# Patient Record
Sex: MALE | Race: OTHER | NOT HISPANIC OR LATINO
[De-identification: names, ages, dates, MRNs, and addresses within clinical notes are randomized per-mention and may not be internally consistent; named-entity substitution may affect disease eponyms.]

---

## 2017-03-11 ENCOUNTER — APPOINTMENT (OUTPATIENT)
Dept: INTERNAL MEDICINE | Facility: CLINIC | Age: 52
End: 2017-03-11

## 2017-03-11 ENCOUNTER — MED ADMIN CHARGE (OUTPATIENT)
Age: 52
End: 2017-03-11

## 2017-03-11 ENCOUNTER — NON-APPOINTMENT (OUTPATIENT)
Age: 52
End: 2017-03-11

## 2017-03-11 VITALS
HEIGHT: 73.2 IN | DIASTOLIC BLOOD PRESSURE: 66 MMHG | HEART RATE: 76 BPM | WEIGHT: 149 LBS | OXYGEN SATURATION: 99 % | SYSTOLIC BLOOD PRESSURE: 100 MMHG | TEMPERATURE: 97.9 F | BODY MASS INDEX: 19.54 KG/M2

## 2017-03-11 DIAGNOSIS — H33.21 SEROUS RETINAL DETACHMENT, RIGHT EYE: ICD-10-CM

## 2017-03-11 LAB
BILIRUB UR QL STRIP: NORMAL
GLUCOSE UR-MCNC: NORMAL
HCG UR QL: 0.2 EU/DL
HGB UR QL STRIP.AUTO: NORMAL
KETONES UR-MCNC: NORMAL
LEUKOCYTE ESTERASE UR QL STRIP: NORMAL
NITRITE UR QL STRIP: NORMAL
PH UR STRIP: 7
PROT UR STRIP-MCNC: NORMAL
SP GR UR STRIP: 1.02

## 2017-03-17 ENCOUNTER — TRANSCRIPTION ENCOUNTER (OUTPATIENT)
Age: 52
End: 2017-03-17

## 2017-04-21 ENCOUNTER — MESSAGE (OUTPATIENT)
Age: 52
End: 2017-04-21

## 2017-04-21 ENCOUNTER — TRANSCRIPTION ENCOUNTER (OUTPATIENT)
Age: 52
End: 2017-04-21

## 2017-04-21 LAB
25(OH)D3 SERPL-MCNC: 15.5 NG/ML
ALBUMIN SERPL ELPH-MCNC: 4.4 G/DL
ALP BLD-CCNC: 82 U/L
ALT SERPL-CCNC: 19 U/L
ANION GAP SERPL CALC-SCNC: 16 MMOL/L
APPEARANCE: CLEAR
AST SERPL-CCNC: 25 U/L
BACTERIA: NEGATIVE
BASOPHILS # BLD AUTO: 0.04 K/UL
BASOPHILS NFR BLD AUTO: 0.9 %
BILIRUB SERPL-MCNC: 0.8 MG/DL
BILIRUBIN URINE: NEGATIVE
BLOOD URINE: NEGATIVE
BUN SERPL-MCNC: 15 MG/DL
CALCIUM SERPL-MCNC: 10 MG/DL
CHLORIDE SERPL-SCNC: 99 MMOL/L
CHOLEST SERPL-MCNC: 208 MG/DL
CHOLEST/HDLC SERPL: 2.4 RATIO
CO2 SERPL-SCNC: 26 MMOL/L
COLOR: YELLOW
CREAT SERPL-MCNC: 0.96 MG/DL
EOSINOPHIL # BLD AUTO: 0.17 K/UL
EOSINOPHIL NFR BLD AUTO: 3.7 %
GLUCOSE QUALITATIVE U: NORMAL MG/DL
GLUCOSE SERPL-MCNC: 83 MG/DL
HBA1C MFR BLD HPLC: 5.7 %
HCT VFR BLD CALC: 46.2 %
HDLC SERPL-MCNC: 85 MG/DL
HGB BLD-MCNC: 14.8 G/DL
HYALINE CASTS: 1 /LPF
IMM GRANULOCYTES NFR BLD AUTO: 0 %
KETONES URINE: NEGATIVE
LDLC SERPL CALC-MCNC: 109 MG/DL
LEUKOCYTE ESTERASE URINE: NEGATIVE
LYMPHOCYTES # BLD AUTO: 1.27 K/UL
LYMPHOCYTES NFR BLD AUTO: 27.3 %
MAN DIFF?: NORMAL
MCHC RBC-ENTMCNC: 30.3 PG
MCHC RBC-ENTMCNC: 32 GM/DL
MCV RBC AUTO: 94.7 FL
MICROSCOPIC-UA: NORMAL
MONOCYTES # BLD AUTO: 0.39 K/UL
MONOCYTES NFR BLD AUTO: 8.4 %
NEUTROPHILS # BLD AUTO: 2.78 K/UL
NEUTROPHILS NFR BLD AUTO: 59.7 %
NITRITE URINE: NEGATIVE
PH URINE: 6.5
PLATELET # BLD AUTO: 205 K/UL
POTASSIUM SERPL-SCNC: 4.1 MMOL/L
PROT SERPL-MCNC: 8.1 G/DL
PROTEIN URINE: NEGATIVE MG/DL
PSA SERPL-MCNC: 0.78 NG/ML
RBC # BLD: 4.88 M/UL
RBC # FLD: 15.5 %
RED BLOOD CELLS URINE: 5 /HPF
SODIUM SERPL-SCNC: 141 MMOL/L
SPECIFIC GRAVITY URINE: 1.02
SQUAMOUS EPITHELIAL CELLS: 0 /HPF
T4 FREE SERPL-MCNC: 1.1 NG/DL
TRIGL SERPL-MCNC: 72 MG/DL
TSH SERPL-ACNC: 3.87 UIU/ML
UROBILINOGEN URINE: NORMAL MG/DL
WBC # FLD AUTO: 4.65 K/UL
WHITE BLOOD CELLS URINE: 2 /HPF

## 2017-09-07 ENCOUNTER — APPOINTMENT (OUTPATIENT)
Dept: GASTROENTEROLOGY | Facility: CLINIC | Age: 52
End: 2017-09-07

## 2018-02-03 ENCOUNTER — APPOINTMENT (OUTPATIENT)
Dept: INTERNAL MEDICINE | Facility: CLINIC | Age: 53
End: 2018-02-03
Payer: COMMERCIAL

## 2018-02-03 ENCOUNTER — NON-APPOINTMENT (OUTPATIENT)
Age: 53
End: 2018-02-03

## 2018-02-03 ENCOUNTER — LABORATORY RESULT (OUTPATIENT)
Age: 53
End: 2018-02-03

## 2018-02-03 VITALS
HEIGHT: 73 IN | TEMPERATURE: 97.7 F | OXYGEN SATURATION: 99 % | BODY MASS INDEX: 20.54 KG/M2 | HEART RATE: 76 BPM | SYSTOLIC BLOOD PRESSURE: 100 MMHG | WEIGHT: 155 LBS | DIASTOLIC BLOOD PRESSURE: 80 MMHG

## 2018-02-03 PROCEDURE — 36415 COLL VENOUS BLD VENIPUNCTURE: CPT

## 2018-02-03 PROCEDURE — 82270 OCCULT BLOOD FECES: CPT

## 2018-02-03 PROCEDURE — 99396 PREV VISIT EST AGE 40-64: CPT | Mod: 25

## 2018-02-03 PROCEDURE — 93000 ELECTROCARDIOGRAM COMPLETE: CPT

## 2018-02-03 RX ORDER — APREMILAST 10-20-30MG
10 & 20 & 30 KIT ORAL
Qty: 55 | Refills: 0 | Status: COMPLETED | COMMUNITY
Start: 2018-01-26

## 2018-02-05 ENCOUNTER — TRANSCRIPTION ENCOUNTER (OUTPATIENT)
Age: 53
End: 2018-02-05

## 2018-02-05 LAB
25(OH)D3 SERPL-MCNC: 12.1 NG/ML
APPEARANCE: CLEAR
BACTERIA: NEGATIVE
BASOPHILS # BLD AUTO: 0.03 K/UL
BASOPHILS NFR BLD AUTO: 0.5 %
BILIRUBIN URINE: NEGATIVE
BLOOD URINE: NEGATIVE
CHOLEST SERPL-MCNC: 221 MG/DL
CHOLEST/HDLC SERPL: 2.7 RATIO
COLOR: YELLOW
EOSINOPHIL # BLD AUTO: 0.22 K/UL
EOSINOPHIL NFR BLD AUTO: 3.9 %
ESTIMATED AVERAGE GLUCOSE: 111 MG/DL
GLUCOSE QUALITATIVE U: NEGATIVE MG/DL
HBA1C MFR BLD HPLC: 5.5 %
HCT VFR BLD CALC: 45.2 %
HDLC SERPL-MCNC: 82 MG/DL
HGB BLD-MCNC: 14.4 G/DL
HYALINE CASTS: 3 /LPF
IMM GRANULOCYTES NFR BLD AUTO: 0.2 %
KETONES URINE: NEGATIVE
LDLC SERPL CALC-MCNC: 121 MG/DL
LEUKOCYTE ESTERASE URINE: NEGATIVE
LYMPHOCYTES # BLD AUTO: 1.64 K/UL
LYMPHOCYTES NFR BLD AUTO: 29.3 %
MAN DIFF?: NORMAL
MCHC RBC-ENTMCNC: 29.8 PG
MCHC RBC-ENTMCNC: 31.9 GM/DL
MCV RBC AUTO: 93.6 FL
MICROSCOPIC-UA: NORMAL
MONOCYTES # BLD AUTO: 0.54 K/UL
MONOCYTES NFR BLD AUTO: 9.6 %
NEUTROPHILS # BLD AUTO: 3.16 K/UL
NEUTROPHILS NFR BLD AUTO: 56.5 %
NITRITE URINE: NEGATIVE
PH URINE: 5
PLATELET # BLD AUTO: 203 K/UL
PROTEIN URINE: NEGATIVE MG/DL
PSA SERPL-MCNC: 0.92 NG/ML
RBC # BLD: 4.83 M/UL
RBC # FLD: 14.7 %
RED BLOOD CELLS URINE: 5 /HPF
SPECIFIC GRAVITY URINE: 1.02
SQUAMOUS EPITHELIAL CELLS: 1 /HPF
T4 FREE SERPL-MCNC: 1.1 NG/DL
TRIGL SERPL-MCNC: 88 MG/DL
TSH SERPL-ACNC: 3.62 UIU/ML
UROBILINOGEN URINE: NEGATIVE MG/DL
WBC # FLD AUTO: 5.6 K/UL
WHITE BLOOD CELLS URINE: 1 /HPF

## 2018-03-06 ENCOUNTER — APPOINTMENT (OUTPATIENT)
Dept: UROLOGY | Facility: CLINIC | Age: 53
End: 2018-03-06

## 2018-03-23 ENCOUNTER — APPOINTMENT (OUTPATIENT)
Dept: UROLOGY | Facility: CLINIC | Age: 53
End: 2018-03-23
Payer: COMMERCIAL

## 2018-03-23 VITALS
BODY MASS INDEX: 18.65 KG/M2 | TEMPERATURE: 98.1 F | DIASTOLIC BLOOD PRESSURE: 68 MMHG | HEART RATE: 76 BPM | WEIGHT: 150 LBS | HEIGHT: 75 IN | SYSTOLIC BLOOD PRESSURE: 119 MMHG

## 2018-03-23 PROCEDURE — 99204 OFFICE O/P NEW MOD 45 MIN: CPT

## 2018-03-26 LAB
APPEARANCE: CLEAR
BACTERIA: NEGATIVE
BILIRUBIN URINE: NEGATIVE
BLOOD URINE: ABNORMAL
COLOR: YELLOW
GLUCOSE QUALITATIVE U: NEGATIVE MG/DL
HYALINE CASTS: 2 /LPF
KETONES URINE: NEGATIVE
LEUKOCYTE ESTERASE URINE: NEGATIVE
MICROSCOPIC-UA: NORMAL
NITRITE URINE: NEGATIVE
PH URINE: 5.5
PROTEIN URINE: NEGATIVE MG/DL
RED BLOOD CELLS URINE: 5 /HPF
SPECIFIC GRAVITY URINE: 1.02
SQUAMOUS EPITHELIAL CELLS: 0 /HPF
URINE CULTURE <10: NORMAL
UROBILINOGEN URINE: NEGATIVE MG/DL
WHITE BLOOD CELLS URINE: 1 /HPF

## 2018-03-27 LAB — CORE LAB FLUID CYTOLOGY: NORMAL

## 2018-04-05 ENCOUNTER — FORM ENCOUNTER (OUTPATIENT)
Age: 53
End: 2018-04-05

## 2018-04-06 ENCOUNTER — OUTPATIENT (OUTPATIENT)
Dept: OUTPATIENT SERVICES | Facility: HOSPITAL | Age: 53
LOS: 1 days | End: 2018-04-06
Payer: COMMERCIAL

## 2018-04-06 ENCOUNTER — APPOINTMENT (OUTPATIENT)
Dept: CT IMAGING | Facility: IMAGING CENTER | Age: 53
End: 2018-04-06
Payer: COMMERCIAL

## 2018-04-06 DIAGNOSIS — R31.29 OTHER MICROSCOPIC HEMATURIA: ICD-10-CM

## 2018-04-06 PROCEDURE — 74178 CT ABD&PLV WO CNTR FLWD CNTR: CPT

## 2018-04-06 PROCEDURE — 74178 CT ABD&PLV WO CNTR FLWD CNTR: CPT | Mod: 26

## 2018-04-13 ENCOUNTER — MESSAGE (OUTPATIENT)
Age: 53
End: 2018-04-13

## 2018-04-17 ENCOUNTER — OUTPATIENT (OUTPATIENT)
Dept: OUTPATIENT SERVICES | Facility: HOSPITAL | Age: 53
LOS: 1 days | End: 2018-04-17
Payer: COMMERCIAL

## 2018-04-17 ENCOUNTER — APPOINTMENT (OUTPATIENT)
Dept: UROLOGY | Facility: CLINIC | Age: 53
End: 2018-04-17
Payer: COMMERCIAL

## 2018-04-17 DIAGNOSIS — R35.0 FREQUENCY OF MICTURITION: ICD-10-CM

## 2018-04-17 PROCEDURE — 52000 CYSTOURETHROSCOPY: CPT

## 2018-04-18 ENCOUNTER — MESSAGE (OUTPATIENT)
Age: 53
End: 2018-04-18

## 2018-04-19 ENCOUNTER — APPOINTMENT (OUTPATIENT)
Dept: SURGERY | Facility: CLINIC | Age: 53
End: 2018-04-19
Payer: COMMERCIAL

## 2018-04-19 VITALS
TEMPERATURE: 98.1 F | HEART RATE: 93 BPM | SYSTOLIC BLOOD PRESSURE: 110 MMHG | OXYGEN SATURATION: 99 % | DIASTOLIC BLOOD PRESSURE: 70 MMHG | HEIGHT: 75 IN | WEIGHT: 155 LBS | BODY MASS INDEX: 19.27 KG/M2 | RESPIRATION RATE: 16 BRPM

## 2018-04-19 DIAGNOSIS — Z87.19 PERSONAL HISTORY OF OTHER DISEASES OF THE DIGESTIVE SYSTEM: ICD-10-CM

## 2018-04-19 PROCEDURE — 99244 OFF/OP CNSLTJ NEW/EST MOD 40: CPT

## 2018-04-19 RX ORDER — MELOXICAM 15 MG/1
15 TABLET ORAL
Qty: 30 | Refills: 0 | Status: DISCONTINUED | COMMUNITY
Start: 2018-01-12 | End: 2018-04-19

## 2018-04-19 RX ORDER — HYDROCORTISONE 25 MG/G
2.5 CREAM TOPICAL
Qty: 30 | Refills: 0 | Status: DISCONTINUED | COMMUNITY
Start: 2018-01-19 | End: 2018-04-19

## 2018-04-19 RX ORDER — VIT A/VIT C/VIT E/ZINC/COPPER 4296-226
CAPSULE ORAL TWICE DAILY
Refills: 0 | Status: DISCONTINUED | COMMUNITY
End: 2018-04-19

## 2018-04-19 RX ORDER — CLOBETASOL PROPIONATE 0.5 MG/ML
0.05 SOLUTION TOPICAL
Qty: 50 | Refills: 0 | Status: DISCONTINUED | COMMUNITY
Start: 2018-01-19 | End: 2018-04-19

## 2018-04-20 DIAGNOSIS — R31.21 ASYMPTOMATIC MICROSCOPIC HEMATURIA: ICD-10-CM

## 2018-05-11 ENCOUNTER — OUTPATIENT (OUTPATIENT)
Dept: OUTPATIENT SERVICES | Facility: HOSPITAL | Age: 53
LOS: 1 days | End: 2018-05-11
Payer: COMMERCIAL

## 2018-05-11 VITALS
WEIGHT: 149.03 LBS | OXYGEN SATURATION: 98 % | RESPIRATION RATE: 16 BRPM | HEART RATE: 65 BPM | HEIGHT: 75 IN | SYSTOLIC BLOOD PRESSURE: 116 MMHG | TEMPERATURE: 98 F | DIASTOLIC BLOOD PRESSURE: 77 MMHG

## 2018-05-11 DIAGNOSIS — K38.8 OTHER SPECIFIED DISEASES OF APPENDIX: ICD-10-CM

## 2018-05-11 DIAGNOSIS — H33.21 SEROUS RETINAL DETACHMENT, RIGHT EYE: Chronic | ICD-10-CM

## 2018-05-11 DIAGNOSIS — Z01.818 ENCOUNTER FOR OTHER PREPROCEDURAL EXAMINATION: ICD-10-CM

## 2018-05-11 DIAGNOSIS — Z98.890 OTHER SPECIFIED POSTPROCEDURAL STATES: Chronic | ICD-10-CM

## 2018-05-11 DIAGNOSIS — J34.2 DEVIATED NASAL SEPTUM: Chronic | ICD-10-CM

## 2018-05-11 DIAGNOSIS — L40.50 ARTHROPATHIC PSORIASIS, UNSPECIFIED: ICD-10-CM

## 2018-05-11 DIAGNOSIS — K38.9 DISEASE OF APPENDIX, UNSPECIFIED: ICD-10-CM

## 2018-05-11 LAB
HCT VFR BLD CALC: 41.6 % — SIGNIFICANT CHANGE UP (ref 39–50)
HGB BLD-MCNC: 13.2 G/DL — SIGNIFICANT CHANGE UP (ref 13–17)
MCHC RBC-ENTMCNC: 29.3 PG — SIGNIFICANT CHANGE UP (ref 27–34)
MCHC RBC-ENTMCNC: 31.7 GM/DL — LOW (ref 32–36)
MCV RBC AUTO: 92.2 FL — SIGNIFICANT CHANGE UP (ref 80–100)
PLATELET # BLD AUTO: 230 K/UL — SIGNIFICANT CHANGE UP (ref 150–400)
RBC # BLD: 4.51 M/UL — SIGNIFICANT CHANGE UP (ref 4.2–5.8)
RBC # FLD: 15.1 % — HIGH (ref 10.3–14.5)
WBC # BLD: 5.3 K/UL — SIGNIFICANT CHANGE UP (ref 3.8–10.5)
WBC # FLD AUTO: 5.3 K/UL — SIGNIFICANT CHANGE UP (ref 3.8–10.5)

## 2018-05-11 PROCEDURE — 85027 COMPLETE CBC AUTOMATED: CPT

## 2018-05-11 PROCEDURE — G0463: CPT

## 2018-05-11 RX ORDER — CEFOTETAN DISODIUM 1 G
2 VIAL (EA) INJECTION ONCE
Qty: 0 | Refills: 0 | Status: DISCONTINUED | OUTPATIENT
Start: 2018-05-21 | End: 2018-06-05

## 2018-05-11 NOTE — H&P PST ADULT - PMH
Appendix disease    Deviated septum    Inguinal hernia    Psoriatic arthritis  controlled on RX  Right retinal detachment

## 2018-05-11 NOTE — H&P PST ADULT - HISTORY OF PRESENT ILLNESS
54 Y/O Male reports he was seen by Dr Herrera for Microscopic hematuria, S/P CT ABD revealed Mucocele appendix. Seen by Dr Grace. Pt denies any fever , chills, ABD pain, N/V, change in bowel habits.   Presents for Laparoscopic appendectomy 5/21/18.

## 2018-05-11 NOTE — H&P PST ADULT - PSH
Deviated septum  s/p repair  H/O right inguinal hernia repair    Right retinal detachment  s/p repair

## 2018-05-11 NOTE — H&P PST ADULT - NSANTHOSAYNRD_GEN_A_CORE
No. CHU screening performed.  STOP BANG Legend: 0-2 = LOW Risk; 3-4 = INTERMEDIATE Risk; 5-8 = HIGH Risk

## 2018-05-11 NOTE — H&P PST ADULT - CONSTITUTIONAL DETAILS
no distress/well-groomed/well-developed/well-nourished no distress/well-developed/well-groomed/cachectic

## 2018-05-20 ENCOUNTER — TRANSCRIPTION ENCOUNTER (OUTPATIENT)
Age: 53
End: 2018-05-20

## 2018-05-21 ENCOUNTER — OUTPATIENT (OUTPATIENT)
Dept: OUTPATIENT SERVICES | Facility: HOSPITAL | Age: 53
LOS: 1 days | End: 2018-05-21
Payer: COMMERCIAL

## 2018-05-21 ENCOUNTER — APPOINTMENT (OUTPATIENT)
Dept: SURGERY | Facility: HOSPITAL | Age: 53
End: 2018-05-21
Payer: COMMERCIAL

## 2018-05-21 ENCOUNTER — RESULT REVIEW (OUTPATIENT)
Age: 53
End: 2018-05-21

## 2018-05-21 VITALS
SYSTOLIC BLOOD PRESSURE: 107 MMHG | HEART RATE: 78 BPM | TEMPERATURE: 98 F | DIASTOLIC BLOOD PRESSURE: 71 MMHG | RESPIRATION RATE: 18 BRPM | HEIGHT: 75 IN | WEIGHT: 149.03 LBS | OXYGEN SATURATION: 100 %

## 2018-05-21 VITALS
OXYGEN SATURATION: 100 % | SYSTOLIC BLOOD PRESSURE: 114 MMHG | HEART RATE: 52 BPM | TEMPERATURE: 98 F | RESPIRATION RATE: 16 BRPM | DIASTOLIC BLOOD PRESSURE: 70 MMHG

## 2018-05-21 DIAGNOSIS — K38.8 OTHER SPECIFIED DISEASES OF APPENDIX: ICD-10-CM

## 2018-05-21 DIAGNOSIS — Z98.890 OTHER SPECIFIED POSTPROCEDURAL STATES: Chronic | ICD-10-CM

## 2018-05-21 DIAGNOSIS — H33.21 SEROUS RETINAL DETACHMENT, RIGHT EYE: Chronic | ICD-10-CM

## 2018-05-21 DIAGNOSIS — J34.2 DEVIATED NASAL SEPTUM: Chronic | ICD-10-CM

## 2018-05-21 PROCEDURE — 44970 LAPAROSCOPY APPENDECTOMY: CPT

## 2018-05-21 PROCEDURE — 88304 TISSUE EXAM BY PATHOLOGIST: CPT

## 2018-05-21 PROCEDURE — 88304 TISSUE EXAM BY PATHOLOGIST: CPT | Mod: 26

## 2018-05-21 RX ORDER — SODIUM CHLORIDE 9 MG/ML
3 INJECTION INTRAMUSCULAR; INTRAVENOUS; SUBCUTANEOUS EVERY 8 HOURS
Qty: 0 | Refills: 0 | Status: DISCONTINUED | OUTPATIENT
Start: 2018-05-21 | End: 2018-05-21

## 2018-05-21 RX ORDER — ONDANSETRON 8 MG/1
4 TABLET, FILM COATED ORAL ONCE
Qty: 0 | Refills: 0 | Status: DISCONTINUED | OUTPATIENT
Start: 2018-05-21 | End: 2018-05-21

## 2018-05-21 RX ORDER — SODIUM CHLORIDE 9 MG/ML
1000 INJECTION, SOLUTION INTRAVENOUS
Qty: 0 | Refills: 0 | Status: DISCONTINUED | OUTPATIENT
Start: 2018-05-21 | End: 2018-06-05

## 2018-05-21 RX ORDER — HYDROMORPHONE HYDROCHLORIDE 2 MG/ML
0.5 INJECTION INTRAMUSCULAR; INTRAVENOUS; SUBCUTANEOUS
Qty: 0 | Refills: 0 | Status: DISCONTINUED | OUTPATIENT
Start: 2018-05-21 | End: 2018-05-21

## 2018-05-21 RX ORDER — LIDOCAINE HCL 20 MG/ML
0.2 VIAL (ML) INJECTION ONCE
Qty: 0 | Refills: 0 | Status: DISCONTINUED | OUTPATIENT
Start: 2018-05-21 | End: 2018-05-21

## 2018-05-21 NOTE — ASU DISCHARGE PLAN (ADULT/PEDIATRIC). - MEDICATION SUMMARY - MEDICATIONS TO TAKE
I will START or STAY ON the medications listed below when I get home from the hospital:    Percocet 5/325 oral tablet  -- 1 tab(s) by mouth every 6 hours MDD:4  -- Caution federal law prohibits the transfer of this drug to any person other  than the person for whom it was prescribed.  May cause drowsiness.  Alcohol may intensify this effect.  Use care when operating dangerous machinery.  This prescription cannot be refilled.  This product contains acetaminophen.  Do not use  with any other product containing acetaminophen to prevent possible liver damage.  Using more of this medication than prescribed may cause serious breathing problems.    -- Indication: For Post operative pain    Otezla 30 mg oral tablet  -- 1 tab(s) by mouth 2 times a day  -- Indication: For home medication

## 2018-05-21 NOTE — ASU DISCHARGE PLAN (ADULT/PEDIATRIC). - NOTIFY
Unable to Urinate/Pain not relieved by Medications/Fever greater than 101/Persistent Nausea and Vomiting

## 2018-05-21 NOTE — BRIEF OPERATIVE NOTE - PROCEDURE
<<-----Click on this checkbox to enter Procedure Laparoscopic appendectomy  05/21/2018    Active  [id number]

## 2018-05-29 LAB — SURGICAL PATHOLOGY STUDY: SIGNIFICANT CHANGE UP

## 2018-06-05 ENCOUNTER — APPOINTMENT (OUTPATIENT)
Dept: SURGERY | Facility: CLINIC | Age: 53
End: 2018-06-05
Payer: COMMERCIAL

## 2018-06-05 VITALS
OXYGEN SATURATION: 99 % | RESPIRATION RATE: 14 BRPM | WEIGHT: 155 LBS | DIASTOLIC BLOOD PRESSURE: 73 MMHG | HEART RATE: 74 BPM | HEIGHT: 75 IN | SYSTOLIC BLOOD PRESSURE: 112 MMHG | TEMPERATURE: 98.3 F | BODY MASS INDEX: 19.27 KG/M2

## 2018-06-05 DIAGNOSIS — K38.8 OTHER SPECIFIED DISEASES OF APPENDIX: ICD-10-CM

## 2018-06-05 PROCEDURE — 99024 POSTOP FOLLOW-UP VISIT: CPT

## 2018-12-21 PROBLEM — K40.90 UNILATERAL INGUINAL HERNIA, WITHOUT OBSTRUCTION OR GANGRENE, NOT SPECIFIED AS RECURRENT: Chronic | Status: ACTIVE | Noted: 2018-05-11

## 2018-12-21 PROBLEM — K38.9 DISEASE OF APPENDIX, UNSPECIFIED: Chronic | Status: ACTIVE | Noted: 2018-05-11

## 2018-12-21 PROBLEM — J34.2 DEVIATED NASAL SEPTUM: Chronic | Status: ACTIVE | Noted: 2018-05-11

## 2018-12-21 PROBLEM — H33.21 SEROUS RETINAL DETACHMENT, RIGHT EYE: Chronic | Status: ACTIVE | Noted: 2018-05-11

## 2018-12-28 ENCOUNTER — APPOINTMENT (OUTPATIENT)
Dept: INTERNAL MEDICINE | Facility: CLINIC | Age: 53
End: 2018-12-28
Payer: COMMERCIAL

## 2018-12-28 VITALS
OXYGEN SATURATION: 99 % | WEIGHT: 146 LBS | HEART RATE: 77 BPM | HEIGHT: 73 IN | BODY MASS INDEX: 19.35 KG/M2 | SYSTOLIC BLOOD PRESSURE: 90 MMHG | TEMPERATURE: 98.6 F | DIASTOLIC BLOOD PRESSURE: 60 MMHG

## 2018-12-28 VITALS — SYSTOLIC BLOOD PRESSURE: 104 MMHG | DIASTOLIC BLOOD PRESSURE: 65 MMHG

## 2018-12-28 PROCEDURE — 99214 OFFICE O/P EST MOD 30 MIN: CPT

## 2018-12-29 ENCOUNTER — TRANSCRIPTION ENCOUNTER (OUTPATIENT)
Age: 53
End: 2018-12-29

## 2018-12-31 ENCOUNTER — MESSAGE (OUTPATIENT)
Age: 53
End: 2018-12-31

## 2018-12-31 LAB — RAPID RVP RESULT: NOT DETECTED

## 2019-01-04 ENCOUNTER — MESSAGE (OUTPATIENT)
Age: 54
End: 2019-01-04

## 2019-01-08 ENCOUNTER — APPOINTMENT (OUTPATIENT)
Dept: NEUROSURGERY | Facility: CLINIC | Age: 54
End: 2019-01-08
Payer: COMMERCIAL

## 2019-01-08 VITALS
WEIGHT: 146 LBS | SYSTOLIC BLOOD PRESSURE: 130 MMHG | HEART RATE: 67 BPM | BODY MASS INDEX: 19.35 KG/M2 | DIASTOLIC BLOOD PRESSURE: 79 MMHG | HEIGHT: 73 IN

## 2019-01-08 PROCEDURE — 99203 OFFICE O/P NEW LOW 30 MIN: CPT

## 2019-01-22 NOTE — REASON FOR VISIT
[New Patient Visit] : a new patient visit [Referred By: _________] : Patient was referred by [facility] [FreeTextEntry1] : brother diagnosed with a brain tumor

## 2019-01-22 NOTE — HISTORY OF PRESENT ILLNESS
[< 3 months] : less than 3 months [FreeTextEntry1] : concerned since brother diagnosed with a brain tumor [de-identified] : Mr. Montiel is a 55 yo male who arrives with no reports of PMH who is concerned about his familial history of a brain tumor.  His brother was recently diagnosed with a brain tumor and had surgery in [place] with no prior symptoms.  He does not have any other information, only that there were 7 tumors.  He is asymptomatic with no complaints.

## 2019-01-22 NOTE — ASSESSMENT
[FreeTextEntry1] : Mr. Montiel is a 55 yo male who is concerned about his genetic disposition for having a malignant brain tumor since his brother was diagnosed without any prior symptoms.  He is asymptomatic.  We explained we cannot do an MRI as a screening tool with no symptomatology.  We will refer to his PCP for any possible screening that is indicated.  No neurosurgical f/u required at this time.\par \par 1)  No Nsx f/u required.

## 2019-01-22 NOTE — PHYSICAL EXAM
[General Appearance - Alert] : alert [General Appearance - In No Acute Distress] : in no acute distress [General Appearance - Well Nourished] : well nourished [General Appearance - Well Developed] : well developed [Oriented To Time, Place, And Person] : oriented to person, place, and time [Impaired Insight] : insight and judgment were intact [Affect] : the affect was normal [Mood] : the mood was normal [Cranial Nerves Optic (II)] : visual acuity intact bilaterally,  pupils equal round and reactive to light [Cranial Nerves Oculomotor (III)] : extraocular motion intact [Cranial Nerves Trigeminal (V)] : facial sensation intact symmetrically [Cranial Nerves Facial (VII)] : face symmetrical [Cranial Nerves Vestibulocochlear (VIII)] : hearing was intact bilaterally [Cranial Nerves Glossopharyngeal (IX)] : tongue and palate midline [Cranial Nerves Accessory (XI - Cranial And Spinal)] : head turning and shoulder shrug symmetric [Cranial Nerves Hypoglossal (XII)] : there was no tongue deviation with protrusion [Motor Strength] : muscle strength was normal in all four extremities [5] : S1 toe walking 5/5 [Sensation Tactile Decrease] : light touch was intact [Intact] : all reflexes within normal limits bilaterally [PERRL With Normal Accommodation] : pupils were equal in size, round, reactive to light, with normal accommodation [Hearing Threshold Finger Rub Not [place]] : hearing was normal [Neck Appearance] : the appearance of the neck was normal [] : no respiratory distress [Respiration, Rhythm And Depth] : normal respiratory rhythm and effort [Auscultation Breath Sounds / Voice Sounds] : lungs were clear to auscultation bilaterally [Heart Sounds] : normal S1 and S2 [Edema] : there was no peripheral edema [Abnormal Walk] : normal gait [Involuntary Movements] : no involuntary movements were seen [Motor Tone] : muscle strength and tone were normal [Skin Color & Pigmentation] : normal skin color and pigmentation [Romberg's Sign] : Romberg's sign was negtive [Limited Balance] : balance was intact [Tremor] : no tremor present

## 2019-01-22 NOTE — REVIEW OF SYSTEMS
[Negative] : Endocrine [Abdominal Pain] : no abdominal pain [Vomiting] : no vomiting [Diarrhea] : no diarrhea [Dysuria] : no dysuria [Incontinence] : no incontinence [Skin Lesions] : no skin lesions [Skin Wound] : no skin wound [Itching] : no itching [Easy Bleeding] : no tendency for easy bleeding [Easy Bruising] : no tendency for easy bruising

## 2019-01-31 NOTE — HISTORY OF PRESENT ILLNESS
[FreeTextEntry8] : On 12/24/18, patient started to cough, and had a sore throat.  He felt a general weakness.  On 12/26/18, he was coughing up yellow phlegm, with no blood.  No fevers, chills, or chest pain.  There was some dyspnea, worse with walking up steps.  No wheezing.  No sinus pains, body aches, rashes, or Gi symptoms.  No travel, sick contacts, or OTC medications.  Patient saw Dr. Bone and had labs drawn today.\par \par On review of medications, patient notes that he is not on the vitamin D.

## 2019-01-31 NOTE — ASSESSMENT
[FreeTextEntry1] : Patient likely with a viral syndrome.  Will check an RVP, and hold antibiotics for now.  However, patient has a history of a deviated nasal septum, and requests an ENT evaluation.  Referral given.

## 2019-01-31 NOTE — PHYSICAL EXAM
[Normal Voice/Communication] : normal voice/communication [No Respiratory Distress] : no respiratory distress  [Clear to Auscultation] : lungs were clear to auscultation bilaterally [No Accessory Muscle Use] : no accessory muscle use [Normal Rate] : normal rate  [Regular Rhythm] : with a regular rhythm [Normal S1, S2] : normal S1 and S2 [No Murmur] : no murmur heard [No Edema] : there was no peripheral edema [Soft] : abdomen soft [Non Tender] : non-tender [Non-distended] : non-distended [No Masses] : no abdominal mass palpated [Normal Bowel Sounds] : normal bowel sounds [de-identified] : No sinus tenderness.

## 2019-02-05 ENCOUNTER — TRANSCRIPTION ENCOUNTER (OUTPATIENT)
Age: 54
End: 2019-02-05

## 2019-03-14 ENCOUNTER — TRANSCRIPTION ENCOUNTER (OUTPATIENT)
Age: 54
End: 2019-03-14

## 2019-03-18 ENCOUNTER — APPOINTMENT (OUTPATIENT)
Dept: INTERNAL MEDICINE | Facility: CLINIC | Age: 54
End: 2019-03-18
Payer: COMMERCIAL

## 2019-03-18 ENCOUNTER — NON-APPOINTMENT (OUTPATIENT)
Age: 54
End: 2019-03-18

## 2019-03-18 VITALS
OXYGEN SATURATION: 99 % | DIASTOLIC BLOOD PRESSURE: 70 MMHG | BODY MASS INDEX: 18.03 KG/M2 | SYSTOLIC BLOOD PRESSURE: 110 MMHG | HEIGHT: 75 IN | WEIGHT: 145 LBS | HEART RATE: 69 BPM

## 2019-03-18 DIAGNOSIS — J06.9 ACUTE UPPER RESPIRATORY INFECTION, UNSPECIFIED: ICD-10-CM

## 2019-03-18 DIAGNOSIS — Z84.89 FAMILY HISTORY OF OTHER SPECIFIED CONDITIONS: ICD-10-CM

## 2019-03-18 PROCEDURE — 93000 ELECTROCARDIOGRAM COMPLETE: CPT

## 2019-03-18 PROCEDURE — G0444 DEPRESSION SCREEN ANNUAL: CPT

## 2019-03-18 PROCEDURE — 82270 OCCULT BLOOD FECES: CPT

## 2019-03-18 PROCEDURE — 36415 COLL VENOUS BLD VENIPUNCTURE: CPT

## 2019-03-18 PROCEDURE — 99396 PREV VISIT EST AGE 40-64: CPT | Mod: 25

## 2019-03-18 NOTE — HEALTH RISK ASSESSMENT
[Excellent] : ~his/her~  mood as  excellent [No falls in past year] : Patient reported no falls in the past year [0] : 2) Feeling down, depressed, or hopeless: Not at all (0) [Alone] : lives alone [None] : None [Patient reported colonoscopy was normal] : Patient reported colonoscopy was normal [HIV Test offered] : HIV Test offered [] :  [Employed] : employed [Sexually Active] : sexually active [Feels Safe at Home] : Feels safe at home [Fully functional (bathing, dressing, toileting, transferring, walking, feeding)] : Fully functional (bathing, dressing, toileting, transferring, walking, feeding) [Fully functional (using the telephone, shopping, preparing meals, housekeeping, doing laundry, using] : Fully functional and needs no help or supervision to perform IADLs (using the telephone, shopping, preparing meals, housekeeping, doing laundry, using transportation, managing medications and managing finances) [Reports normal functional visual acuity (ie: able to read med bottle)] : Reports normal functional visual acuity [Carbon Monoxide Detector] : carbon monoxide detector [Smoke Detector] : smoke detector [Seat Belt] :  uses seat belt [Sunscreen] : uses sunscreen [With Patient/Caregiver] : With Patient/Caregiver [] : No [[id number]] : 0 [de-identified] : Quit early 30s, started about age 23, was 1/2ppd [Change in mental status noted] : No change in mental status noted [Language] : denies difficulty with language [Behavior] : denies difficulty with behavior [Reasoning] : denies difficulty with reasoning [Handling Complex Tasks] : denies difficulty handling complex tasks [Reports changes in hearing] : Reports no changes in hearing [Reports changes in vision] : Reports no changes in vision [Reports changes in dental health] : Reports no changes in dental health [HepatitisCDate] : 11/08/2014 [ColonoscopyComments] : Will do Cologuard [de-identified] : No h/o STDs. 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 Robb (ophthalmologist). [de-identified] : Going regularly [de-identified] : Drives a car. [FreeTextEntry4] : Patient given Health Care Proxy information today. [AdvancecareDate] : 03/18/2019

## 2019-03-18 NOTE — ASSESSMENT
[FreeTextEntry1] : (1) HCM - discussed diet, exercise, weight maintenance.  Labs ordered as below, including HIV testing. Hepatitis C screening negative November 2014.  Patient declines influenza, Tdap, and Shingrix vaccinations - risks/benefits were discussed.  Patient was given a GI referral for a screening colonoscopy last year and has not gone.  I reviewed the options for FIT and Cologuard with him, and he opts for the Cologuard.  Patient was advised that the follow-up of a positive Cologuard is a colonoscopy.  Materials given.  Written information given to patient on preparing advance directives.\par \par (2) Hernia - patient previously declined imaging or surgical evaluation, but now says he will return to the surgeon for this.  The surgeon that performed the patient's laparoscopic appendectomy noted the hernia, and recommended surgery for the hernia at a later date.  He was advised to go to the ER for acute pain, nausea, vomiting, and/or inability to hold in PO.\par \par (3) Dermatology/rheumatology follow-up for psoriasis.\par \par (4)  - follow-up with Dr. Herrera as directed.\par \par (5) Ortho - right arm pain, benign exam.  Ortho referral given for further input.

## 2019-04-08 ENCOUNTER — APPOINTMENT (OUTPATIENT)
Dept: ORTHOPEDIC SURGERY | Facility: CLINIC | Age: 54
End: 2019-04-08

## 2019-04-15 ENCOUNTER — TRANSCRIPTION ENCOUNTER (OUTPATIENT)
Age: 54
End: 2019-04-15

## 2019-04-15 LAB
25(OH)D3 SERPL-MCNC: 15.7 NG/ML
ALBUMIN SERPL ELPH-MCNC: 4.3 G/DL
ALP BLD-CCNC: 77 U/L
ALT SERPL-CCNC: 12 U/L
ANION GAP SERPL CALC-SCNC: 14 MMOL/L
APPEARANCE: CLEAR
AST SERPL-CCNC: 20 U/L
BACTERIA: NEGATIVE
BASOPHILS # BLD AUTO: 0.03 K/UL
BASOPHILS NFR BLD AUTO: 0.5 %
BILIRUB SERPL-MCNC: 0.4 MG/DL
BILIRUBIN URINE: NEGATIVE
BLOOD URINE: NEGATIVE
BUN SERPL-MCNC: 16 MG/DL
CALCIUM SERPL-MCNC: 9.4 MG/DL
CHLORIDE SERPL-SCNC: 103 MMOL/L
CHOLEST SERPL-MCNC: 186 MG/DL
CHOLEST/HDLC SERPL: 2.4 RATIO
CO2 SERPL-SCNC: 24 MMOL/L
COLOR: NORMAL
CREAT SERPL-MCNC: 0.78 MG/DL
EOSINOPHIL # BLD AUTO: 0.11 K/UL
EOSINOPHIL NFR BLD AUTO: 1.7 %
GLUCOSE QUALITATIVE U: NEGATIVE
GLUCOSE SERPL-MCNC: 90 MG/DL
HBA1C MFR BLD HPLC: 5.7 %
HCT VFR BLD CALC: 42.8 %
HDLC SERPL-MCNC: 79 MG/DL
HGB BLD-MCNC: 13.1 G/DL
HIV1+2 AB SPEC QL IA.RAPID: NONREACTIVE
HYALINE CASTS: 1 /LPF
IMM GRANULOCYTES NFR BLD AUTO: 0.3 %
KETONES URINE: NEGATIVE
LDLC SERPL CALC-MCNC: 96 MG/DL
LEUKOCYTE ESTERASE URINE: NEGATIVE
LYMPHOCYTES # BLD AUTO: 1.2 K/UL
LYMPHOCYTES NFR BLD AUTO: 18.9 %
MAN DIFF?: NORMAL
MCHC RBC-ENTMCNC: 29.6 PG
MCHC RBC-ENTMCNC: 30.6 GM/DL
MCV RBC AUTO: 96.8 FL
MICROSCOPIC-UA: NORMAL
MONOCYTES # BLD AUTO: 0.49 K/UL
MONOCYTES NFR BLD AUTO: 7.7 %
NEUTROPHILS # BLD AUTO: 4.51 K/UL
NEUTROPHILS NFR BLD AUTO: 70.9 %
NITRITE URINE: NEGATIVE
PH URINE: 5.5
PLATELET # BLD AUTO: 214 K/UL
POTASSIUM SERPL-SCNC: 4 MMOL/L
PROT SERPL-MCNC: 6.8 G/DL
PROTEIN URINE: NEGATIVE
PSA SERPL-MCNC: 0.75 NG/ML
RBC # BLD: 4.42 M/UL
RBC # FLD: 15.2 %
RED BLOOD CELLS URINE: 1 /HPF
SODIUM SERPL-SCNC: 141 MMOL/L
SPECIFIC GRAVITY URINE: 1.02
SQUAMOUS EPITHELIAL CELLS: 0 /HPF
T4 FREE SERPL-MCNC: 1 NG/DL
TRIGL SERPL-MCNC: 53 MG/DL
TSH SERPL-ACNC: 2.09 UIU/ML
UROBILINOGEN URINE: NORMAL
WBC # FLD AUTO: 6.36 K/UL
WHITE BLOOD CELLS URINE: 1 /HPF

## 2019-05-06 ENCOUNTER — TRANSCRIPTION ENCOUNTER (OUTPATIENT)
Age: 54
End: 2019-05-06

## 2019-05-07 ENCOUNTER — TRANSCRIPTION ENCOUNTER (OUTPATIENT)
Age: 54
End: 2019-05-07

## 2019-05-10 ENCOUNTER — MESSAGE (OUTPATIENT)
Age: 54
End: 2019-05-10

## 2019-05-10 ENCOUNTER — TRANSCRIPTION ENCOUNTER (OUTPATIENT)
Age: 54
End: 2019-05-10

## 2019-05-13 ENCOUNTER — TRANSCRIPTION ENCOUNTER (OUTPATIENT)
Age: 54
End: 2019-05-13

## 2019-05-15 ENCOUNTER — TRANSCRIPTION ENCOUNTER (OUTPATIENT)
Age: 54
End: 2019-05-15

## 2019-05-28 ENCOUNTER — APPOINTMENT (OUTPATIENT)
Dept: VASCULAR SURGERY | Facility: CLINIC | Age: 54
End: 2019-05-28

## 2019-06-17 ENCOUNTER — APPOINTMENT (OUTPATIENT)
Dept: INTERNAL MEDICINE | Facility: CLINIC | Age: 54
End: 2019-06-17
Payer: COMMERCIAL

## 2019-06-17 ENCOUNTER — APPOINTMENT (OUTPATIENT)
Dept: INTERNAL MEDICINE | Facility: CLINIC | Age: 54
End: 2019-06-17

## 2019-06-17 VITALS
HEIGHT: 75 IN | OXYGEN SATURATION: 97 % | TEMPERATURE: 98.7 F | DIASTOLIC BLOOD PRESSURE: 80 MMHG | RESPIRATION RATE: 14 BRPM | SYSTOLIC BLOOD PRESSURE: 120 MMHG | HEART RATE: 92 BPM

## 2019-06-17 DIAGNOSIS — B97.89 ACUTE UPPER RESPIRATORY INFECTION, UNSPECIFIED: ICD-10-CM

## 2019-06-17 DIAGNOSIS — J06.9 ACUTE UPPER RESPIRATORY INFECTION, UNSPECIFIED: ICD-10-CM

## 2019-06-17 PROCEDURE — 99213 OFFICE O/P EST LOW 20 MIN: CPT

## 2019-06-17 NOTE — ASSESSMENT
[FreeTextEntry1] : Suspect viral URI. Send RVP to confirm. Suggest Mucinex-DM but he is reluctant to try any OTC medications. He will rest, drink plenty of fluids, and have hot tea and chicken soup. Suggest vitamin C daily. Referred again to ENT for deviated nasal septum.

## 2019-06-17 NOTE — PHYSICAL EXAM
[No Acute Distress] : no acute distress [Well Nourished] : well nourished [Well-Appearing] : well-appearing [PERRL] : pupils equal round and reactive to light [EOMI] : extraocular movements intact [Normal Oropharynx] : the oropharynx was normal [Normal TMs] : both tympanic membranes were normal [Supple] : supple [No Lymphadenopathy] : no lymphadenopathy [No Respiratory Distress] : no respiratory distress  [Clear to Auscultation] : lungs were clear to auscultation bilaterally [No Accessory Muscle Use] : no accessory muscle use [Normal Rate] : normal rate  [Regular Rhythm] : with a regular rhythm [Normal S1, S2] : normal S1 and S2 [No Murmur] : no murmur heard [No Edema] : there was no peripheral edema [Soft] : abdomen soft [Non Tender] : non-tender [No Rash] : no rash [Normal Gait] : normal gait [Normal Mood] : the mood was normal [de-identified] : +PND [de-identified] : large RLE varicose veins  [de-identified] : mildly anxious

## 2019-06-17 NOTE — HISTORY OF PRESENT ILLNESS
[FreeTextEntry8] : Patient comes for an acute visit. \par \par He complains of a productive cough with yellow sputum along with tactile fever and hoarseness. Has mild sore throat. Started feeling sick on Friday afternoon. He feels dyspneic typically with cough. No chest pain. He did not check home temps. He is taking Aspirin otherwise not taking OTC meds. He denies sick contacts or recent travel.

## 2019-06-18 ENCOUNTER — TRANSCRIPTION ENCOUNTER (OUTPATIENT)
Age: 54
End: 2019-06-18

## 2019-06-18 LAB
HPIV3 RNA SPEC QL NAA+PROBE: DETECTED
RAPID RVP RESULT: DETECTED

## 2019-06-19 ENCOUNTER — TRANSCRIPTION ENCOUNTER (OUTPATIENT)
Age: 54
End: 2019-06-19

## 2019-11-01 ENCOUNTER — APPOINTMENT (OUTPATIENT)
Dept: INTERNAL MEDICINE | Facility: CLINIC | Age: 54
End: 2019-11-01

## 2019-12-13 ENCOUNTER — APPOINTMENT (OUTPATIENT)
Dept: INTERNAL MEDICINE | Facility: CLINIC | Age: 54
End: 2019-12-13
Payer: COMMERCIAL

## 2019-12-13 VITALS
SYSTOLIC BLOOD PRESSURE: 114 MMHG | TEMPERATURE: 97.3 F | BODY MASS INDEX: 17.91 KG/M2 | HEIGHT: 75 IN | OXYGEN SATURATION: 98 % | HEART RATE: 75 BPM | WEIGHT: 144 LBS | DIASTOLIC BLOOD PRESSURE: 80 MMHG | RESPIRATION RATE: 16 BRPM

## 2019-12-13 PROCEDURE — 36415 COLL VENOUS BLD VENIPUNCTURE: CPT

## 2019-12-13 PROCEDURE — 71046 X-RAY EXAM CHEST 2 VIEWS: CPT

## 2019-12-13 PROCEDURE — 99396 PREV VISIT EST AGE 40-64: CPT | Mod: 25

## 2019-12-13 PROCEDURE — 94010 BREATHING CAPACITY TEST: CPT

## 2019-12-13 PROCEDURE — 93000 ELECTROCARDIOGRAM COMPLETE: CPT

## 2019-12-13 NOTE — ASSESSMENT
[FreeTextEntry1] : Basically normal healthexcept for psoriasis and psoriatic arthritis.He will continue rheumatology followup.\par Same Rx. Blood work results are pending.

## 2019-12-13 NOTE — HISTORY OF PRESENT ILLNESS
[FreeTextEntry1] : Complete annual examination [de-identified] : He has long-standing chronic fatigue and weakness. He has been greatly troubled by psoriasis and psoriatic arthritis. He is seen regularly by his rheumatologist-.

## 2019-12-13 NOTE — DATA REVIEWED
[FreeTextEntry1] : Electrocardiogram reveals a sinus rhythm and is within normal limits.Spirometry is normal. Pulse oximetry is 98%.\par chest x-ray reveals clear lung fields And a normal heart size. There is a mild thoracic scoliosis.. no active disease.\par \par Blood work and urinalysis results are pending.

## 2019-12-13 NOTE — HEALTH RISK ASSESSMENT
[Very Good] : ~his/her~  mood as very good [] : Yes [Monthly or less (1 pt)] : Monthly or less (1 point) [Never (0 pts)] : Never (0 points) [No] : In the past 12 months have you used drugs other than those required for medical reasons? No [No falls in past year] : Patient reported no falls in the past year [0] : 2) Feeling down, depressed, or hopeless: Not at all (0) [Patient declined colonoscopy] : Patient declined colonoscopy [HIV test declined] : HIV test declined [Hepatitis C test declined] : Hepatitis C test declined [None] : None [With Family] : lives with family [Employed] : employed [] :  [Sexually Active] : sexually active [Feels Safe at Home] : Feels safe at home [Fully functional (bathing, dressing, toileting, transferring, walking, feeding)] : Fully functional (bathing, dressing, toileting, transferring, walking, feeding) [Fully functional (using the telephone, shopping, preparing meals, housekeeping, doing laundry, using] : Fully functional and needs no help or supervision to perform IADLs (using the telephone, shopping, preparing meals, housekeeping, doing laundry, using transportation, managing medications and managing finances) [Reports normal functional visual acuity (ie: able to read med bottle)] : Reports normal functional visual acuity [Carbon Monoxide Detector] : carbon monoxide detector [Smoke Detector] : smoke detector [Sunscreen] : uses sunscreen [Patient/Caregiver unclear of wishes] : Patient/Caregiver unclear of wishes [de-identified] : rheumatology [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 He has tried an elliptical and will consider swimming regularly. [Audit-CScore] : One [[id number]] : 0 [de-identified] : Generally healthy [Change in mental status noted] : No change in mental status noted [Language] : denies difficulty with language [Learning/Retaining New Information] : denies difficulty learning/retaining new information [Behavior] : denies difficulty with behavior [Handling Complex Tasks] : denies difficulty handling complex tasks [Reasoning] : denies difficulty with reasoning [Spatial Ability and Orientation] : denies difficulty with spatial ability and orientation [High Risk Behavior] : no high risk behavior [Reports changes in hearing] : Reports no changes in hearing [Reports changes in vision] : Reports no changes in vision [Reports changes in dental health] : Reports no changes in dental health [Travel to Developing Areas] : does not  travel to developing areas [TB Exposure] : is not being exposed to tuberculosis [Caregiver Concerns] : does not have caregiver concerns [ColonoscopyComments] : DINAH T.-DNA  stool test -4/19 [FreeTextEntry2] :  at [employer].-Computer sciences-interested in artificial intelligence [AdvancecareDate] : 12/19

## 2019-12-13 NOTE — REVIEW OF SYSTEMS
[Fatigue] : fatigue [Dyspnea on Exertion] : dyspnea on exertion [Nocturia] : nocturia [Joint Pain] : joint pain [Joint Stiffness] : joint stiffness [Joint Swelling] : joint swelling [Negative] : Neurological [Fever] : no fever [Chills] : no chills [Hot Flashes] : no hot flashes [Night Sweats] : no night sweats [Recent Change In Weight] : ~T no recent weight change [Dysuria] : no dysuria [Incontinence] : no incontinence [Hesitancy] : no hesitancy [Hematuria] : no hematuria [Frequency] : no frequency [Impotence] : no impotency [Poor Libido] : libido not poor [Muscle Weakness] : no muscle weakness [Muscle Pain] : no muscle pain [Back Pain] : no back pain [FreeTextEntry2] : Always slender [FreeTextEntry6] : After walking several flights which he associates with lack of exercise. [FreeTextEntry8] : Nocturia x1

## 2019-12-17 ENCOUNTER — TRANSCRIPTION ENCOUNTER (OUTPATIENT)
Age: 54
End: 2019-12-17

## 2019-12-17 LAB
25(OH)D3 SERPL-MCNC: 15.2 NG/ML
ALBUMIN SERPL ELPH-MCNC: 4.3 G/DL
ALP BLD-CCNC: 78 U/L
ALT SERPL-CCNC: 17 U/L
ANION GAP SERPL CALC-SCNC: 12 MMOL/L
APPEARANCE: CLEAR
AST SERPL-CCNC: 24 U/L
BASOPHILS # BLD AUTO: 0.03 K/UL
BASOPHILS NFR BLD AUTO: 0.5 %
BILIRUB SERPL-MCNC: 0.5 MG/DL
BILIRUBIN URINE: NEGATIVE
BLOOD URINE: NEGATIVE
BUN SERPL-MCNC: 17 MG/DL
CALCIUM SERPL-MCNC: 9.4 MG/DL
CHLORIDE SERPL-SCNC: 100 MMOL/L
CHOLEST SERPL-MCNC: 200 MG/DL
CHOLEST/HDLC SERPL: 2.4 RATIO
CO2 SERPL-SCNC: 27 MMOL/L
COLOR: YELLOW
CREAT SERPL-MCNC: 0.86 MG/DL
EOSINOPHIL # BLD AUTO: 0.12 K/UL
EOSINOPHIL NFR BLD AUTO: 1.9 %
ERYTHROCYTE [SEDIMENTATION RATE] IN BLOOD BY WESTERGREN METHOD: 21 MM/HR
GLUCOSE QUALITATIVE U: NEGATIVE
GLUCOSE SERPL-MCNC: 100 MG/DL
HCT VFR BLD CALC: 45.7 %
HDLC SERPL-MCNC: 82 MG/DL
HGB BLD-MCNC: 14.2 G/DL
IMM GRANULOCYTES NFR BLD AUTO: 0.2 %
KETONES URINE: NEGATIVE
LDLC SERPL CALC-MCNC: 105 MG/DL
LEUKOCYTE ESTERASE URINE: NEGATIVE
LYMPHOCYTES # BLD AUTO: 1.45 K/UL
LYMPHOCYTES NFR BLD AUTO: 23.2 %
MAN DIFF?: NORMAL
MCHC RBC-ENTMCNC: 29.5 PG
MCHC RBC-ENTMCNC: 31.1 GM/DL
MCV RBC AUTO: 95 FL
MONOCYTES # BLD AUTO: 0.52 K/UL
MONOCYTES NFR BLD AUTO: 8.3 %
NEUTROPHILS # BLD AUTO: 4.13 K/UL
NEUTROPHILS NFR BLD AUTO: 65.9 %
NITRITE URINE: NEGATIVE
PH URINE: 6
PLATELET # BLD AUTO: 217 K/UL
POTASSIUM SERPL-SCNC: 4.3 MMOL/L
PROT SERPL-MCNC: 7.6 G/DL
PROTEIN URINE: NORMAL
PSA SERPL-MCNC: 0.67 NG/ML
RBC # BLD: 4.81 M/UL
RBC # FLD: 15.2 %
SODIUM SERPL-SCNC: 139 MMOL/L
SPECIFIC GRAVITY URINE: 1.02
T3 SERPL-MCNC: 95 NG/DL
T4 SERPL-MCNC: 6.1 UG/DL
TRIGL SERPL-MCNC: 64 MG/DL
TSH SERPL-ACNC: 2.11 UIU/ML
UROBILINOGEN URINE: NORMAL
WBC # FLD AUTO: 6.26 K/UL

## 2020-11-30 ENCOUNTER — TRANSCRIPTION ENCOUNTER (OUTPATIENT)
Age: 55
End: 2020-11-30

## 2020-12-08 ENCOUNTER — APPOINTMENT (OUTPATIENT)
Dept: RHEUMATOLOGY | Facility: CLINIC | Age: 55
End: 2020-12-08
Payer: COMMERCIAL

## 2020-12-08 VITALS
HEIGHT: 75 IN | SYSTOLIC BLOOD PRESSURE: 119 MMHG | TEMPERATURE: 96.8 F | OXYGEN SATURATION: 98 % | RESPIRATION RATE: 16 BRPM | WEIGHT: 142 LBS | HEART RATE: 72 BPM | DIASTOLIC BLOOD PRESSURE: 71 MMHG | BODY MASS INDEX: 17.66 KG/M2

## 2020-12-08 PROCEDURE — 99244 OFF/OP CNSLTJ NEW/EST MOD 40: CPT

## 2020-12-08 PROCEDURE — 99072 ADDL SUPL MATRL&STAF TM PHE: CPT

## 2020-12-10 ENCOUNTER — TRANSCRIPTION ENCOUNTER (OUTPATIENT)
Age: 55
End: 2020-12-10

## 2020-12-10 LAB
ALBUMIN SERPL ELPH-MCNC: 4.4 G/DL
ALP BLD-CCNC: 87 U/L
ALT SERPL-CCNC: 12 U/L
ANION GAP SERPL CALC-SCNC: 11 MMOL/L
AST SERPL-CCNC: 18 U/L
BASOPHILS # BLD AUTO: 0.03 K/UL
BASOPHILS NFR BLD AUTO: 0.7 %
BILIRUB SERPL-MCNC: 0.5 MG/DL
BUN SERPL-MCNC: 16 MG/DL
CALCIUM SERPL-MCNC: 9.4 MG/DL
CHLORIDE SERPL-SCNC: 103 MMOL/L
CO2 SERPL-SCNC: 28 MMOL/L
CREAT SERPL-MCNC: 0.81 MG/DL
CRP SERPL-MCNC: 0.23 MG/DL
EOSINOPHIL # BLD AUTO: 0.12 K/UL
EOSINOPHIL NFR BLD AUTO: 2.6 %
ERYTHROCYTE [SEDIMENTATION RATE] IN BLOOD BY WESTERGREN METHOD: 26 MM/HR
GLUCOSE SERPL-MCNC: 94 MG/DL
HBV CORE IGG+IGM SER QL: NONREACTIVE
HBV SURFACE AG SER QL: NONREACTIVE
HCT VFR BLD CALC: 44.7 %
HCV AB SER QL: NONREACTIVE
HCV S/CO RATIO: 0.1 S/CO
HGB BLD-MCNC: 13.6 G/DL
IMM GRANULOCYTES NFR BLD AUTO: 0.2 %
LYMPHOCYTES # BLD AUTO: 1.19 K/UL
LYMPHOCYTES NFR BLD AUTO: 25.9 %
M TB IFN-G BLD-IMP: NEGATIVE
MAN DIFF?: NORMAL
MCHC RBC-ENTMCNC: 29.2 PG
MCHC RBC-ENTMCNC: 30.4 GM/DL
MCV RBC AUTO: 96.1 FL
MONOCYTES # BLD AUTO: 0.37 K/UL
MONOCYTES NFR BLD AUTO: 8.1 %
NEUTROPHILS # BLD AUTO: 2.87 K/UL
NEUTROPHILS NFR BLD AUTO: 62.5 %
PLATELET # BLD AUTO: 184 K/UL
POTASSIUM SERPL-SCNC: 4.2 MMOL/L
PROT SERPL-MCNC: 6.9 G/DL
QUANTIFERON TB PLUS MITOGEN MINUS NIL: 7.55 IU/ML
QUANTIFERON TB PLUS NIL: 0.01 IU/ML
QUANTIFERON TB PLUS TB1 MINUS NIL: 0 IU/ML
QUANTIFERON TB PLUS TB2 MINUS NIL: 0 IU/ML
RBC # BLD: 4.65 M/UL
RBC # FLD: 15 %
SODIUM SERPL-SCNC: 142 MMOL/L
WBC # FLD AUTO: 4.59 K/UL

## 2020-12-11 ENCOUNTER — TRANSCRIPTION ENCOUNTER (OUTPATIENT)
Age: 55
End: 2020-12-11

## 2020-12-12 ENCOUNTER — TRANSCRIPTION ENCOUNTER (OUTPATIENT)
Age: 55
End: 2020-12-12

## 2020-12-14 ENCOUNTER — TRANSCRIPTION ENCOUNTER (OUTPATIENT)
Age: 55
End: 2020-12-14

## 2020-12-18 LAB — HLA-B27 RELATED AG QL: NEGATIVE

## 2020-12-21 PROBLEM — J06.9 VIRAL URI WITH COUGH: Status: RESOLVED | Noted: 2019-06-17 | Resolved: 2020-12-21

## 2020-12-29 ENCOUNTER — APPOINTMENT (OUTPATIENT)
Dept: RADIOLOGY | Facility: CLINIC | Age: 55
End: 2020-12-29
Payer: COMMERCIAL

## 2020-12-29 ENCOUNTER — OUTPATIENT (OUTPATIENT)
Dept: OUTPATIENT SERVICES | Facility: HOSPITAL | Age: 55
LOS: 1 days | End: 2020-12-29
Payer: COMMERCIAL

## 2020-12-29 ENCOUNTER — APPOINTMENT (OUTPATIENT)
Dept: INTERNAL MEDICINE | Facility: CLINIC | Age: 55
End: 2020-12-29
Payer: COMMERCIAL

## 2020-12-29 VITALS
OXYGEN SATURATION: 98 % | SYSTOLIC BLOOD PRESSURE: 116 MMHG | WEIGHT: 150 LBS | TEMPERATURE: 97.4 F | HEART RATE: 74 BPM | RESPIRATION RATE: 16 BRPM | HEIGHT: 75 IN | DIASTOLIC BLOOD PRESSURE: 70 MMHG | BODY MASS INDEX: 18.65 KG/M2

## 2020-12-29 DIAGNOSIS — J34.2 DEVIATED NASAL SEPTUM: Chronic | ICD-10-CM

## 2020-12-29 DIAGNOSIS — L40.50 ARTHROPATHIC PSORIASIS, UNSPECIFIED: ICD-10-CM

## 2020-12-29 DIAGNOSIS — H33.21 SEROUS RETINAL DETACHMENT, RIGHT EYE: Chronic | ICD-10-CM

## 2020-12-29 DIAGNOSIS — Z98.890 OTHER SPECIFIED POSTPROCEDURAL STATES: Chronic | ICD-10-CM

## 2020-12-29 PROCEDURE — 99072 ADDL SUPL MATRL&STAF TM PHE: CPT

## 2020-12-29 PROCEDURE — 73130 X-RAY EXAM OF HAND: CPT

## 2020-12-29 PROCEDURE — 73110 X-RAY EXAM OF WRIST: CPT | Mod: 26,50

## 2020-12-29 PROCEDURE — 73110 X-RAY EXAM OF WRIST: CPT

## 2020-12-29 PROCEDURE — 73130 X-RAY EXAM OF HAND: CPT | Mod: 26,50

## 2020-12-29 PROCEDURE — 71046 X-RAY EXAM CHEST 2 VIEWS: CPT

## 2020-12-29 PROCEDURE — 99396 PREV VISIT EST AGE 40-64: CPT | Mod: 25

## 2020-12-29 PROCEDURE — 93000 ELECTROCARDIOGRAM COMPLETE: CPT

## 2020-12-29 NOTE — HEALTH RISK ASSESSMENT
[Excellent] : ~his/her~  mood as  excellent [] : Yes [0-5] : 0-5 [No] : In the past 12 months have you used drugs other than those required for medical reasons? No [No falls in past year] : Patient reported no falls in the past year [0] : 2) Feeling down, depressed, or hopeless: Not at all (0) [None] : None [With Family] : lives with family [Employed] : employed [FreeTextEntry1] : Gen. health [de-identified] : Rheumatology [YearQuit] : 1990 [de-identified] : Sedentary [de-identified] : Healthy [BLA1Zemcf] : 0 [Change in mental status noted] : No change in mental status noted [Language] : denies difficulty with language [Behavior] : denies difficulty with behavior [Learning/Retaining New Information] : denies difficulty learning/retaining new information [Handling Complex Tasks] : denies difficulty handling complex tasks [Reasoning] : denies difficulty with reasoning [Spatial Ability and Orientation] : denies difficulty with spatial ability and orientation [ColonoscopyComments] : Remote or never [HepatitisCDate] : 12/2020 [HepatitisCComments] : negative [FreeTextEntry2] : teacher

## 2020-12-29 NOTE — REVIEW OF SYSTEMS
[Nocturia] : nocturia [Negative] : Neurological [Chest Pain] : no chest pain [Palpitations] : palpitations [Claudication] : no  leg claudication [Lower Ext Edema] : no lower extremity edema [Orthopena] : no orthopnea [Paroxysmal Nocturnal Dyspnea] : no paroxysmal nocturnal dyspnea [Shortness Of Breath] : no shortness of breath [Wheezing] : no wheezing [Cough] : cough [Dyspnea on Exertion] : not dyspnea on exertion [FreeTextEntry5] : A stress [FreeTextEntry6] : dry cough in the a.m. during the winter [FreeTextEntry8] : Infrequent nocturia- , less than one time per week [de-identified] : Moderate psoriasis

## 2020-12-29 NOTE — ASSESSMENT
[FreeTextEntry1] : Normal health. He will make an MA appointment for more complete blood studies. he states that he will schedule a colonoscopy at some point in the future His wound.\par He will continue his vitamin D supplement at the present dose pending blood work results.\par \par CPE in one year.

## 2020-12-29 NOTE — DATA REVIEWED
[FreeTextEntry1] : blood work which was performed that his rheumatologist was satisfactory including a CBC and a CMP. Hepatitis C was also negative.He will return for more comprehensive blood work\par                                                                                                                                                                            Chest x-ray reveals clear lung fields, normal heart size and a normal bony thoraxadmission no active disease.\par Electrocardiogram reveals a regular sinus rhythm and a mild widening of the QRS complex, orderline EKG

## 2020-12-30 ENCOUNTER — TRANSCRIPTION ENCOUNTER (OUTPATIENT)
Age: 55
End: 2020-12-30

## 2021-01-05 ENCOUNTER — APPOINTMENT (OUTPATIENT)
Dept: RHEUMATOLOGY | Facility: CLINIC | Age: 56
End: 2021-01-05
Payer: COMMERCIAL

## 2021-01-05 PROCEDURE — 99441: CPT

## 2021-01-13 ENCOUNTER — TRANSCRIPTION ENCOUNTER (OUTPATIENT)
Age: 56
End: 2021-01-13

## 2021-01-20 ENCOUNTER — TRANSCRIPTION ENCOUNTER (OUTPATIENT)
Age: 56
End: 2021-01-20

## 2021-01-28 ENCOUNTER — TRANSCRIPTION ENCOUNTER (OUTPATIENT)
Age: 56
End: 2021-01-28

## 2021-02-16 ENCOUNTER — TRANSCRIPTION ENCOUNTER (OUTPATIENT)
Age: 56
End: 2021-02-16

## 2021-02-17 ENCOUNTER — TRANSCRIPTION ENCOUNTER (OUTPATIENT)
Age: 56
End: 2021-02-17

## 2021-02-22 ENCOUNTER — TRANSCRIPTION ENCOUNTER (OUTPATIENT)
Age: 56
End: 2021-02-22

## 2021-04-07 ENCOUNTER — NON-APPOINTMENT (OUTPATIENT)
Age: 56
End: 2021-04-07

## 2021-05-06 ENCOUNTER — APPOINTMENT (OUTPATIENT)
Dept: SURGERY | Facility: CLINIC | Age: 56
End: 2021-05-06
Payer: COMMERCIAL

## 2021-05-06 VITALS
WEIGHT: 150 LBS | HEIGHT: 75 IN | OXYGEN SATURATION: 98 % | BODY MASS INDEX: 18.65 KG/M2 | HEART RATE: 73 BPM | RESPIRATION RATE: 18 BRPM | SYSTOLIC BLOOD PRESSURE: 123 MMHG | TEMPERATURE: 97 F | DIASTOLIC BLOOD PRESSURE: 87 MMHG

## 2021-05-06 PROCEDURE — 99072 ADDL SUPL MATRL&STAF TM PHE: CPT

## 2021-05-06 PROCEDURE — 99214 OFFICE O/P EST MOD 30 MIN: CPT

## 2021-05-06 NOTE — HISTORY OF PRESENT ILLNESS
[de-identified] : Manuel is a 56 year old male here for a consultation. He reports left inguinal bulge for the past few months. Denies pain, N/V or constipation. Denies the use of anticoagulants, Never had any abdominal surgery.

## 2021-05-06 NOTE — PHYSICAL EXAM
[Normal Breath Sounds] : Normal breath sounds [Normal Heart Sounds] : normal heart sounds [No Rash or Lesion] : No rash or lesion [Alert] : alert [Oriented to Person] : oriented to person [Oriented to Place] : oriented to place [Oriented to Time] : oriented to time [Calm] : calm [JVD] : no jugular venous distention  [Abdominal Masses] : No abdominal masses [Abdomen Tenderness] : ~T ~M No abdominal tenderness [No HSM] : no hepatosplenomegaly [de-identified] : Well nourished male in no apparent distress [de-identified] : WNL [de-identified] : There is a reducible left inguinal hernia present left cord and testicle are normal [de-identified] : ROM WNL

## 2021-05-06 NOTE — ASSESSMENT
[FreeTextEntry1] : Discussed the repair of this left inguinal hernia with the patient.  The patient understands that mesh will be used in the repair.  Patient is also understands that this will be done in an ambulatory facility and that anesthesia will do a tap block on the left side prior to starting the operation.\par \par I have discussed the risks and benefits  of surgery with the patient.  The risks which include  but are not exclusive of other issues included bleeding, bowel injury and the possibility of using mesh with the inherent risk of infection requiring the removal of the mesh.

## 2021-05-07 ENCOUNTER — EMERGENCY (EMERGENCY)
Facility: HOSPITAL | Age: 56
LOS: 1 days | Discharge: HOME | End: 2021-05-07
Attending: EMERGENCY MEDICINE
Payer: COMMERCIAL

## 2021-05-07 ENCOUNTER — APPOINTMENT (OUTPATIENT)
Dept: INTERNAL MEDICINE | Facility: CLINIC | Age: 56
End: 2021-05-07

## 2021-05-07 VITALS
RESPIRATION RATE: 18 BRPM | DIASTOLIC BLOOD PRESSURE: 70 MMHG | SYSTOLIC BLOOD PRESSURE: 116 MMHG | TEMPERATURE: 99 F | HEART RATE: 68 BPM | OXYGEN SATURATION: 100 %

## 2021-05-07 VITALS
OXYGEN SATURATION: 99 % | DIASTOLIC BLOOD PRESSURE: 73 MMHG | WEIGHT: 145.06 LBS | HEART RATE: 66 BPM | TEMPERATURE: 98 F | RESPIRATION RATE: 18 BRPM | HEIGHT: 75 IN | SYSTOLIC BLOOD PRESSURE: 126 MMHG

## 2021-05-07 DIAGNOSIS — Z98.890 OTHER SPECIFIED POSTPROCEDURAL STATES: Chronic | ICD-10-CM

## 2021-05-07 DIAGNOSIS — J34.2 DEVIATED NASAL SEPTUM: Chronic | ICD-10-CM

## 2021-05-07 DIAGNOSIS — H33.21 SEROUS RETINAL DETACHMENT, RIGHT EYE: Chronic | ICD-10-CM

## 2021-05-07 LAB
ALBUMIN SERPL ELPH-MCNC: 4 G/DL — SIGNIFICANT CHANGE UP (ref 3.3–5)
ALP SERPL-CCNC: 81 U/L — SIGNIFICANT CHANGE UP (ref 40–120)
ALT FLD-CCNC: 14 U/L — SIGNIFICANT CHANGE UP (ref 10–45)
ANION GAP SERPL CALC-SCNC: 14 MMOL/L — SIGNIFICANT CHANGE UP (ref 5–17)
APTT BLD: 27.2 SEC — LOW (ref 27.5–35.5)
AST SERPL-CCNC: 19 U/L — SIGNIFICANT CHANGE UP (ref 10–40)
BASE EXCESS BLDV CALC-SCNC: 5.2 MMOL/L — HIGH (ref -2–2)
BASE EXCESS BLDV CALC-SCNC: 5.3 MMOL/L — HIGH (ref -2–2)
BASOPHILS # BLD AUTO: 0.05 K/UL — SIGNIFICANT CHANGE UP (ref 0–0.2)
BASOPHILS NFR BLD AUTO: 0.7 % — SIGNIFICANT CHANGE UP (ref 0–2)
BILIRUB SERPL-MCNC: 0.6 MG/DL — SIGNIFICANT CHANGE UP (ref 0.2–1.2)
BUN SERPL-MCNC: 16 MG/DL — SIGNIFICANT CHANGE UP (ref 7–23)
CA-I SERPL-SCNC: 1.1 MMOL/L — LOW (ref 1.12–1.3)
CA-I SERPL-SCNC: 1.13 MMOL/L — SIGNIFICANT CHANGE UP (ref 1.12–1.3)
CALCIUM SERPL-MCNC: 9 MG/DL — SIGNIFICANT CHANGE UP (ref 8.4–10.5)
CHLORIDE BLDV-SCNC: 107 MMOL/L — SIGNIFICANT CHANGE UP (ref 96–108)
CHLORIDE BLDV-SCNC: 109 MMOL/L — HIGH (ref 96–108)
CHLORIDE SERPL-SCNC: 103 MMOL/L — SIGNIFICANT CHANGE UP (ref 96–108)
CK SERPL-CCNC: 102 U/L — SIGNIFICANT CHANGE UP (ref 30–200)
CO2 BLDV-SCNC: 33 MMOL/L — HIGH (ref 22–30)
CO2 BLDV-SCNC: 34 MMOL/L — HIGH (ref 22–30)
CO2 SERPL-SCNC: 24 MMOL/L — SIGNIFICANT CHANGE UP (ref 22–31)
CREAT SERPL-MCNC: 0.87 MG/DL — SIGNIFICANT CHANGE UP (ref 0.5–1.3)
EOSINOPHIL # BLD AUTO: 0.12 K/UL — SIGNIFICANT CHANGE UP (ref 0–0.5)
EOSINOPHIL NFR BLD AUTO: 1.7 % — SIGNIFICANT CHANGE UP (ref 0–6)
GAS PNL BLDV: 134 MMOL/L — LOW (ref 135–145)
GAS PNL BLDV: 136 MMOL/L — SIGNIFICANT CHANGE UP (ref 135–145)
GAS PNL BLDV: SIGNIFICANT CHANGE UP
GLUCOSE BLDV-MCNC: 119 MG/DL — HIGH (ref 70–99)
GLUCOSE BLDV-MCNC: 93 MG/DL — SIGNIFICANT CHANGE UP (ref 70–99)
GLUCOSE SERPL-MCNC: 118 MG/DL — HIGH (ref 70–99)
HCO3 BLDV-SCNC: 31 MMOL/L — HIGH (ref 21–29)
HCO3 BLDV-SCNC: 32 MMOL/L — HIGH (ref 21–29)
HCT VFR BLD CALC: 40.1 % — SIGNIFICANT CHANGE UP (ref 39–50)
HCT VFR BLDA CALC: 38 % — LOW (ref 39–50)
HCT VFR BLDA CALC: 42 % — SIGNIFICANT CHANGE UP (ref 39–50)
HGB BLD CALC-MCNC: 12.5 G/DL — LOW (ref 13–17)
HGB BLD CALC-MCNC: 13.7 G/DL — SIGNIFICANT CHANGE UP (ref 13–17)
HGB BLD-MCNC: 12.8 G/DL — LOW (ref 13–17)
IMM GRANULOCYTES NFR BLD AUTO: 0.3 % — SIGNIFICANT CHANGE UP (ref 0–1.5)
INR BLD: 1.07 RATIO — SIGNIFICANT CHANGE UP (ref 0.88–1.16)
LACTATE BLDV-MCNC: 1.3 MMOL/L — SIGNIFICANT CHANGE UP (ref 0.7–2)
LACTATE BLDV-MCNC: 2.2 MMOL/L — HIGH (ref 0.7–2)
LYMPHOCYTES # BLD AUTO: 1.42 K/UL — SIGNIFICANT CHANGE UP (ref 1–3.3)
LYMPHOCYTES # BLD AUTO: 20.3 % — SIGNIFICANT CHANGE UP (ref 13–44)
MAGNESIUM SERPL-MCNC: 2.1 MG/DL — SIGNIFICANT CHANGE UP (ref 1.6–2.6)
MCHC RBC-ENTMCNC: 29.6 PG — SIGNIFICANT CHANGE UP (ref 27–34)
MCHC RBC-ENTMCNC: 31.9 GM/DL — LOW (ref 32–36)
MCV RBC AUTO: 92.6 FL — SIGNIFICANT CHANGE UP (ref 80–100)
MONOCYTES # BLD AUTO: 0.57 K/UL — SIGNIFICANT CHANGE UP (ref 0–0.9)
MONOCYTES NFR BLD AUTO: 8.2 % — SIGNIFICANT CHANGE UP (ref 2–14)
NEUTROPHILS # BLD AUTO: 4.8 K/UL — SIGNIFICANT CHANGE UP (ref 1.8–7.4)
NEUTROPHILS NFR BLD AUTO: 68.8 % — SIGNIFICANT CHANGE UP (ref 43–77)
NRBC # BLD: 0 /100 WBCS — SIGNIFICANT CHANGE UP (ref 0–0)
NT-PROBNP SERPL-SCNC: 90 PG/ML — SIGNIFICANT CHANGE UP (ref 0–300)
OTHER CELLS CSF MANUAL: 3 ML/DL — LOW (ref 18–22)
PCO2 BLDV: 55 MMHG — HIGH (ref 35–50)
PCO2 BLDV: 61 MMHG — HIGH (ref 35–50)
PH BLDV: 7.34 — LOW (ref 7.35–7.45)
PH BLDV: 7.37 — SIGNIFICANT CHANGE UP (ref 7.35–7.45)
PHOSPHATE SERPL-MCNC: 2.3 MG/DL — LOW (ref 2.5–4.5)
PLATELET # BLD AUTO: 204 K/UL — SIGNIFICANT CHANGE UP (ref 150–400)
PO2 BLDV: 23 MMHG — LOW (ref 25–45)
PO2 BLDV: <20 MMHG — LOW (ref 25–45)
POTASSIUM BLDV-SCNC: 3.5 MMOL/L — SIGNIFICANT CHANGE UP (ref 3.5–5.3)
POTASSIUM BLDV-SCNC: 3.6 MMOL/L — SIGNIFICANT CHANGE UP (ref 3.5–5.3)
POTASSIUM SERPL-MCNC: 3.8 MMOL/L — SIGNIFICANT CHANGE UP (ref 3.5–5.3)
POTASSIUM SERPL-SCNC: 3.8 MMOL/L — SIGNIFICANT CHANGE UP (ref 3.5–5.3)
PROT SERPL-MCNC: 6.7 G/DL — SIGNIFICANT CHANGE UP (ref 6–8.3)
PROTHROM AB SERPL-ACNC: 12.8 SEC — SIGNIFICANT CHANGE UP (ref 10.6–13.6)
RBC # BLD: 4.33 M/UL — SIGNIFICANT CHANGE UP (ref 4.2–5.8)
RBC # FLD: 14.6 % — HIGH (ref 10.3–14.5)
SAO2 % BLDV: 18 % — LOW (ref 67–88)
SAO2 % BLDV: 36 % — LOW (ref 67–88)
SODIUM SERPL-SCNC: 141 MMOL/L — SIGNIFICANT CHANGE UP (ref 135–145)
TROPONIN T, HIGH SENSITIVITY RESULT: 8 NG/L — SIGNIFICANT CHANGE UP (ref 0–51)
TROPONIN T, HIGH SENSITIVITY RESULT: 8 NG/L — SIGNIFICANT CHANGE UP (ref 0–51)
WBC # BLD: 6.98 K/UL — SIGNIFICANT CHANGE UP (ref 3.8–10.5)
WBC # FLD AUTO: 6.98 K/UL — SIGNIFICANT CHANGE UP (ref 3.8–10.5)

## 2021-05-07 PROCEDURE — 93010 ELECTROCARDIOGRAM REPORT: CPT

## 2021-05-07 PROCEDURE — 82962 GLUCOSE BLOOD TEST: CPT

## 2021-05-07 PROCEDURE — 85018 HEMOGLOBIN: CPT

## 2021-05-07 PROCEDURE — 82435 ASSAY OF BLOOD CHLORIDE: CPT

## 2021-05-07 PROCEDURE — 80053 COMPREHEN METABOLIC PANEL: CPT

## 2021-05-07 PROCEDURE — 85025 COMPLETE CBC W/AUTO DIFF WBC: CPT

## 2021-05-07 PROCEDURE — 82947 ASSAY GLUCOSE BLOOD QUANT: CPT

## 2021-05-07 PROCEDURE — 84484 ASSAY OF TROPONIN QUANT: CPT

## 2021-05-07 PROCEDURE — 82803 BLOOD GASES ANY COMBINATION: CPT

## 2021-05-07 PROCEDURE — 85610 PROTHROMBIN TIME: CPT

## 2021-05-07 PROCEDURE — 36000 PLACE NEEDLE IN VEIN: CPT

## 2021-05-07 PROCEDURE — 83605 ASSAY OF LACTIC ACID: CPT

## 2021-05-07 PROCEDURE — 93005 ELECTROCARDIOGRAM TRACING: CPT

## 2021-05-07 PROCEDURE — 85014 HEMATOCRIT: CPT

## 2021-05-07 PROCEDURE — 84100 ASSAY OF PHOSPHORUS: CPT

## 2021-05-07 PROCEDURE — 99285 EMERGENCY DEPT VISIT HI MDM: CPT

## 2021-05-07 PROCEDURE — 82550 ASSAY OF CK (CPK): CPT

## 2021-05-07 PROCEDURE — 84295 ASSAY OF SERUM SODIUM: CPT

## 2021-05-07 PROCEDURE — 99284 EMERGENCY DEPT VISIT MOD MDM: CPT | Mod: 25

## 2021-05-07 PROCEDURE — 83735 ASSAY OF MAGNESIUM: CPT

## 2021-05-07 PROCEDURE — 83880 ASSAY OF NATRIURETIC PEPTIDE: CPT

## 2021-05-07 PROCEDURE — 71046 X-RAY EXAM CHEST 2 VIEWS: CPT

## 2021-05-07 PROCEDURE — 71046 X-RAY EXAM CHEST 2 VIEWS: CPT | Mod: 26

## 2021-05-07 PROCEDURE — 85730 THROMBOPLASTIN TIME PARTIAL: CPT

## 2021-05-07 PROCEDURE — 84132 ASSAY OF SERUM POTASSIUM: CPT

## 2021-05-07 PROCEDURE — 82330 ASSAY OF CALCIUM: CPT

## 2021-05-07 RX ORDER — SODIUM CHLORIDE 9 MG/ML
1000 INJECTION INTRAMUSCULAR; INTRAVENOUS; SUBCUTANEOUS ONCE
Refills: 0 | Status: COMPLETED | OUTPATIENT
Start: 2021-05-07 | End: 2021-05-07

## 2021-05-07 RX ADMIN — SODIUM CHLORIDE 1000 MILLILITER(S): 9 INJECTION INTRAMUSCULAR; INTRAVENOUS; SUBCUTANEOUS at 13:46

## 2021-05-07 NOTE — ED ADULT NURSE NOTE - OBJECTIVE STATEMENT
57y/o male aaox4  no pertinent PMHx presents to the ED via EMS from home s/p syncope an d varicose vein bleeding   . Pt states that he had an episode of  bleeding had at 7 am "Bleeding a lot" able to control w/ gauze and band ai Denies fever, chills, n/v, weakness, abd pain, diarrhea/constipation, numbness/tingling, urinary s/s, in no respiratory distress, no CP, PT safety maintained with family at bedside, call bell within reach and bed in the lowest position. 57y/o male aaox4  no pertinent PMHx presents to the ED via EMS from home s/p syncope and varicose vein bleeding   . Pt states that he had an episode of  bleeding had at 7 am "Bleeding a lot" able to control w/ gauze and band aid, upon assessment the site of the varicose vein in not bleeding, +pedal pulses, +sensation, +ROM, warm to touch, no pain. Later pt felt weak and dizzy and syncopize, as per pt  he LOC for 5-10 minutes , denies hitting the head, no blood thinners, At this moment pt denies fever, chills, n/v, weakness, abd pain, diarrhea/constipation, numbness/tingling, urinary symptoms , in no respiratory distress, no CP.  Safety and comfort measures initiated- bed placed in lowest position and side rails raised. Pt oriented to call bell system.

## 2021-05-07 NOTE — ED PROVIDER NOTE - NS ED ROS FT
GENERAL: No fever or chills  EYES: No change in vision  HEENT: No trouble swallowing or speaking  CARDIAC: No chest pain  PULMONARY: No cough or SOB  GI: No abdominal pain, no nausea or no vomiting, no diarrhea or constipation  : No changes in urination  SKIN: No rashes  NEURO: +syncope; no headache, no numbness  MSK: No joint pain  Otherwise as HPI or negative.  HEME: +bleeding

## 2021-05-07 NOTE — ED PROVIDER NOTE - PATIENT PORTAL LINK FT
You can access the FollowMyHealth Patient Portal offered by [facility] by registering at the following website: [email or web address]. By joining [employer]’s FollowMyHealth portal, you will also be able to view your health information using other applications (apps) compatible with our system.

## 2021-05-07 NOTE — ED PROVIDER NOTE - PHYSICAL EXAMINATION
Gen: NAD, A&Ox4. Non-toxic appearing  HEENT: Normocephalic and atraumatic. EOMI, no nasal discharge, mucous membranes moist, no scleral icterus.  CV: Regular rate and rhythm, +S1/S2, no M/R/G. No significant lower extremity edema. Radial and DP pulses present and symmetrical. Capillary refill less than 2 seconds.  Resp: Normal effort and rate. CTAB, no rales, rhonchi, or wheezes.  GI: Abdomen soft, non-distended, non tender to palpation. No masses appreciated. Bowel sounds present.  MSK: No open wounds and no bruising  Neuro: Following commands, speaking in full sentences, moving extremities spontaneously. CN II-XII intact. Strength and sensation symmetric and intact throughout. Reflexes 2+ throughout. Cerebellar testing normal.  Psych: Appropriate mood, cooperative Gen: NAD, A&Ox4. Non-toxic appearing  HEENT: Normocephalic and atraumatic. EOMI, no nasal discharge, mucous membranes moist, no scleral icterus.  CV: Regular rate and rhythm, +S1/S2, no M/R/G. No significant lower extremity edema. Radial and DP pulses present and symmetrical. Capillary refill less than 2 seconds.  Resp: Normal effort and rate. CTAB, no rales, rhonchi, or wheezes.  GI: Abdomen soft, non-distended, non tender to palpation. No masses appreciated. Bowel sounds present.  MSK: No open wounds and no bruising  Neuro: Following commands, speaking in full sentences, moving extremities spontaneously. CN II-XII intact. Strength and sensation symmetric and intact throughout. Reflexes 2+ throughout. Cerebellar testing normal.  Psych: Appropriate mood, cooperative    attn - alert, NAD, no pallor or jaundice, PERRL 3 mm, moist mm, skin - warm and dry, Lungs - clear, no w/r/r, good BS bilaterally, Cor - rr, no M, no rub, Abdo - ND, soft, NT, no HSM, no CVAT, no guarding or rebound. Extremities - no edema, no calf tenderness, distal pulses intact and symmetrical, Neuro - intact and non-focal, Skin - varicose veins right lower leg - no active bleeding or hematoma.

## 2021-05-07 NOTE — ED PROVIDER NOTE - CLINICAL SUMMARY MEDICAL DECISION MAKING FREE TEXT BOX
Abel Leon MD (PGY-1): The patient is a 56y Male with no pmhx except varicose veins complaining of syncope and bleeding from varicose veins. Low suspicion for ACS as pt lacks risk factors and EKG was unremarkable. Unlikely pt had seizure. Syncopal episode possible due to bleeding from varicose vein, vasovagal episode, orthostatics hypotension. IVF, labs, ekg, cxr, trop. Pt will most likely be discharged home. Abel Leon MD (PGY-1): The patient is a 56y Male with no pmhx except varicose veins complaining of syncope and bleeding from varicose veins. Low suspicion for ACS as pt lacks risk factors and EKG was unremarkable. Unlikely pt had seizure. Syncopal episode possible due to bleeding from varicose vein, vasovagal episode, orthostatics hypotension. IVF, labs, ekg, cxr, trop. Pt will most likely be discharged home.     attn - pt with syncope after significant bleeding from Right lower leg varicose vein - low concern for ACS - labs, H/H, reassess.

## 2021-05-07 NOTE — ED PROVIDER NOTE - PROGRESS NOTE DETAILS
Abel Leon MD (PGY-1): EKG, labs, trop, and imaging wnl. Will wrap wound on RLE and discharge pt home.

## 2021-05-26 ENCOUNTER — OUTPATIENT (OUTPATIENT)
Dept: OUTPATIENT SERVICES | Facility: HOSPITAL | Age: 56
LOS: 1 days | End: 2021-05-26
Payer: COMMERCIAL

## 2021-05-26 VITALS
HEIGHT: 75 IN | WEIGHT: 149.91 LBS | TEMPERATURE: 99 F | DIASTOLIC BLOOD PRESSURE: 76 MMHG | RESPIRATION RATE: 14 BRPM | HEART RATE: 79 BPM | OXYGEN SATURATION: 98 % | SYSTOLIC BLOOD PRESSURE: 122 MMHG

## 2021-05-26 DIAGNOSIS — Z90.49 ACQUIRED ABSENCE OF OTHER SPECIFIED PARTS OF DIGESTIVE TRACT: Chronic | ICD-10-CM

## 2021-05-26 DIAGNOSIS — K40.90 UNILATERAL INGUINAL HERNIA, WITHOUT OBSTRUCTION OR GANGRENE, NOT SPECIFIED AS RECURRENT: ICD-10-CM

## 2021-05-26 DIAGNOSIS — J34.2 DEVIATED NASAL SEPTUM: Chronic | ICD-10-CM

## 2021-05-26 DIAGNOSIS — Z98.890 OTHER SPECIFIED POSTPROCEDURAL STATES: Chronic | ICD-10-CM

## 2021-05-26 DIAGNOSIS — Z01.818 ENCOUNTER FOR OTHER PREPROCEDURAL EXAMINATION: ICD-10-CM

## 2021-05-26 DIAGNOSIS — H33.21 SEROUS RETINAL DETACHMENT, RIGHT EYE: Chronic | ICD-10-CM

## 2021-05-26 LAB
ANION GAP SERPL CALC-SCNC: 13 MMOL/L — SIGNIFICANT CHANGE UP (ref 5–17)
BUN SERPL-MCNC: 22 MG/DL — SIGNIFICANT CHANGE UP (ref 7–23)
CALCIUM SERPL-MCNC: 9.4 MG/DL — SIGNIFICANT CHANGE UP (ref 8.4–10.5)
CHLORIDE SERPL-SCNC: 104 MMOL/L — SIGNIFICANT CHANGE UP (ref 96–108)
CO2 SERPL-SCNC: 24 MMOL/L — SIGNIFICANT CHANGE UP (ref 22–31)
CREAT SERPL-MCNC: 0.87 MG/DL — SIGNIFICANT CHANGE UP (ref 0.5–1.3)
GLUCOSE SERPL-MCNC: 87 MG/DL — SIGNIFICANT CHANGE UP (ref 70–99)
HCT VFR BLD CALC: 39 % — SIGNIFICANT CHANGE UP (ref 39–50)
HGB BLD-MCNC: 12.4 G/DL — LOW (ref 13–17)
MCHC RBC-ENTMCNC: 30.2 PG — SIGNIFICANT CHANGE UP (ref 27–34)
MCHC RBC-ENTMCNC: 31.8 GM/DL — LOW (ref 32–36)
MCV RBC AUTO: 95.1 FL — SIGNIFICANT CHANGE UP (ref 80–100)
NRBC # BLD: 0 /100 WBCS — SIGNIFICANT CHANGE UP (ref 0–0)
PLATELET # BLD AUTO: 218 K/UL — SIGNIFICANT CHANGE UP (ref 150–400)
POTASSIUM SERPL-MCNC: 4.2 MMOL/L — SIGNIFICANT CHANGE UP (ref 3.5–5.3)
POTASSIUM SERPL-SCNC: 4.2 MMOL/L — SIGNIFICANT CHANGE UP (ref 3.5–5.3)
RBC # BLD: 4.1 M/UL — LOW (ref 4.2–5.8)
RBC # FLD: 14.7 % — HIGH (ref 10.3–14.5)
SODIUM SERPL-SCNC: 141 MMOL/L — SIGNIFICANT CHANGE UP (ref 135–145)
WBC # BLD: 6.19 K/UL — SIGNIFICANT CHANGE UP (ref 3.8–10.5)
WBC # FLD AUTO: 6.19 K/UL — SIGNIFICANT CHANGE UP (ref 3.8–10.5)

## 2021-05-26 PROCEDURE — G0463: CPT

## 2021-05-26 PROCEDURE — 80048 BASIC METABOLIC PNL TOTAL CA: CPT

## 2021-05-26 PROCEDURE — 85027 COMPLETE CBC AUTOMATED: CPT

## 2021-05-26 RX ORDER — CHLORHEXIDINE GLUCONATE 213 G/1000ML
1 SOLUTION TOPICAL ONCE
Refills: 0 | Status: DISCONTINUED | OUTPATIENT
Start: 2021-06-01 | End: 2021-06-15

## 2021-05-26 RX ORDER — CEFAZOLIN SODIUM 1 G
2000 VIAL (EA) INJECTION ONCE
Refills: 0 | Status: DISCONTINUED | OUTPATIENT
Start: 2021-06-01 | End: 2021-06-15

## 2021-05-26 RX ORDER — LIDOCAINE HCL 20 MG/ML
0.2 VIAL (ML) INJECTION ONCE
Refills: 0 | Status: DISCONTINUED | OUTPATIENT
Start: 2021-06-01 | End: 2021-06-15

## 2021-05-26 RX ORDER — SODIUM CHLORIDE 9 MG/ML
3 INJECTION INTRAMUSCULAR; INTRAVENOUS; SUBCUTANEOUS EVERY 8 HOURS
Refills: 0 | Status: DISCONTINUED | OUTPATIENT
Start: 2021-06-01 | End: 2021-06-15

## 2021-05-26 RX ORDER — APREMILAST 10-20-30MG
1 KIT ORAL
Qty: 0 | Refills: 0 | DISCHARGE

## 2021-05-26 NOTE — H&P PST ADULT - NSICDXPROBLEM_GEN_ALL_CORE_FT
PROBLEM DIAGNOSES  Problem: Unilateral inguinal hernia without obstruction or gangrene  Assessment and Plan: left inguinal hernia repair

## 2021-05-26 NOTE — H&P PST ADULT - GAIT/BALANCE
Called patient to screen him for Covid 19 before his appointment with  on 6/16/20. He states he needs to cancel this appointment as he has an appointment with another doctor for his neck tomorrow. He will contact our office once he has seen the neurosurgeon.  Appointment cancelled  
steady

## 2021-05-26 NOTE — H&P PST ADULT - NSICDXPASTMEDICALHX_GEN_ALL_CORE_FT
PAST MEDICAL HISTORY:  Appendix disease     Deviated septum     Inguinal hernia     Psoriatic arthritis under controlled without medication currently    Right retinal detachment

## 2021-05-26 NOTE — H&P PST ADULT - RS GEN PE MLT RESP DETAILS PC
Pt is looking forward to discharge to home. Continues to monitor Pt.   breath sounds equal/respirations non-labored/clear to auscultation bilaterally

## 2021-05-26 NOTE — H&P PST ADULT - NSICDXPASTSURGICALHX_GEN_ALL_CORE_FT
PAST SURGICAL HISTORY:  Deviated septum s/p repair    H/O right inguinal hernia repair     Right retinal detachment s/p repair    S/P appendectomy

## 2021-05-26 NOTE — H&P PST ADULT - HISTORY OF PRESENT ILLNESS
54 Y/O Male reports he was seen by Dr Herrera for Microscopic hematuria, S/P CT ABD revealed Mucocele appendix. Seen by Dr Grace. Pt denies any fever , chills, ABD pain, N/V, change in bowel habits.   Presents for Laparoscopic appendectomy 5/21/18.   56y Male complaining of syncope.  - HPI Objective Statement: The patient is a 56y Male with no pmhx except  varicose veins complaining of syncope and bleeding from varicose veins. At  around 7 am today, pt was scratching RLE and varicose vein started to bleed  profusely. Pt was able to apply pressure, bandage it up, and stop bleeding.  About 4 hours later, pt was in kitchen when he felt lightheaded and fell to  kitchen floor and LOC. Denies hitting head. Per wife, pt was unconscious for a  few minutes. No tongue biting, tremors, self-micturition. No post-ictal period.  Pt was able to ambulate after waking up. No HA, CP, SOB, abd pain, leg  swelling, fevers, or pain in any extremity. No allergies. No hx of cardiac  disease.  attn - pt seen in Rm14 - agree with above - pt had syncopal event after  bleeding R lower leg varicose vein this am. no prodrome, cp, palp, recent  illness, n/v, diaphoresis. currently feels back to usual self.    HIV:   56 year old male. PMD varicose vein, psoratic arthritis (currently off medication and under control). c/o left inguinal bulge x months, Denies pain, N/V or constipation. evaluated by Dr. Grace, diagnosed with left inguinal hernia. now presents to PST scheduled for surgical repair on 6/1.  pt denies cough, fever, SOB, recent travel or exposure to confirmed covid cases. [facility]  **5/7/2021- admitted to [facility] with c/o syncope when he scratched on his varicose veins which caused bleeding.  EKG, CXR and CBC unremarkable, pt was discharged home, discharge noted stated no ACS concern.  pt denies another episode of syncope.  pt was instructed to see PCP for preop evaluation.** 56 year old male. PMD varicose vein, psoratic arthritis (currently off medication and under control). c/o left inguinal bulge x months, Denies pain, N/V or constipation. evaluated by Dr. Grace, diagnosed with left inguinal hernia. now presents to PST scheduled for surgical repair on 6/1.  pt denies cough, fever, SOB, recent travel or exposure to confirmed covid cases. covid test scheduled on 5/29 at [facility].  **5/7/2021- admitted to [facility] with c/o syncope when he scratched on his varicose veins which caused bleeding.  EKG, CXR and CBC unremarkable, pt was discharged home, discharge noted stated no ACS concern, see HIE.  pt denies another episode of syncope.  pt was instructed to see PCP for preop evaluation.**

## 2021-05-27 ENCOUNTER — APPOINTMENT (OUTPATIENT)
Dept: INTERNAL MEDICINE | Facility: CLINIC | Age: 56
End: 2021-05-27
Payer: COMMERCIAL

## 2021-05-27 ENCOUNTER — TRANSCRIPTION ENCOUNTER (OUTPATIENT)
Age: 56
End: 2021-05-27

## 2021-05-27 VITALS
DIASTOLIC BLOOD PRESSURE: 78 MMHG | SYSTOLIC BLOOD PRESSURE: 160 MMHG | OXYGEN SATURATION: 99 % | BODY MASS INDEX: 18.4 KG/M2 | WEIGHT: 148 LBS | TEMPERATURE: 97 F | HEIGHT: 75 IN | HEART RATE: 93 BPM

## 2021-05-27 DIAGNOSIS — M79.601 PAIN IN RIGHT ARM: ICD-10-CM

## 2021-05-27 DIAGNOSIS — R31.29 OTHER MICROSCOPIC HEMATURIA: ICD-10-CM

## 2021-05-27 DIAGNOSIS — Z86.79 PERSONAL HISTORY OF OTHER DISEASES OF THE CIRCULATORY SYSTEM: ICD-10-CM

## 2021-05-27 DIAGNOSIS — Z71.89 OTHER SPECIFIED COUNSELING: ICD-10-CM

## 2021-05-27 DIAGNOSIS — Z87.2 PERSONAL HISTORY OF DISEASES OF THE SKIN AND SUBCUTANEOUS TISSUE: ICD-10-CM

## 2021-05-27 DIAGNOSIS — Z87.09 PERSONAL HISTORY OF OTHER DISEASES OF THE RESPIRATORY SYSTEM: ICD-10-CM

## 2021-05-27 DIAGNOSIS — Z87.898 PERSONAL HISTORY OF OTHER SPECIFIED CONDITIONS: ICD-10-CM

## 2021-05-27 DIAGNOSIS — Z92.29 PERSONAL HISTORY OF OTHER DRUG THERAPY: ICD-10-CM

## 2021-05-27 DIAGNOSIS — M79.642 PAIN IN LEFT HAND: ICD-10-CM

## 2021-05-27 DIAGNOSIS — Z71.9 COUNSELING, UNSPECIFIED: ICD-10-CM

## 2021-05-27 PROCEDURE — 99212 OFFICE O/P EST SF 10 MIN: CPT

## 2021-05-27 PROCEDURE — 99072 ADDL SUPL MATRL&STAF TM PHE: CPT

## 2021-05-27 NOTE — HISTORY OF PRESENT ILLNESS
[No Pertinent Cardiac History] : no history of aortic stenosis, atrial fibrillation, coronary artery disease, recent myocardial infarction, or implantable device/pacemaker [No Pertinent Pulmonary History] : no history of asthma, COPD, sleep apnea, or smoking [No Adverse Anesthesia Reaction] : no adverse anesthesia reaction in self or family member [(Patient denies any chest pain, claudication, dyspnea on exertion, orthopnea, palpitations or syncope)] : Patient denies any chest pain, claudication, dyspnea on exertion, orthopnea, palpitations or syncope [Aortic Stenosis] : no aortic stenosis [Atrial Fibrillation] : no atrial fibrillation [Coronary Artery Disease] : no coronary artery disease [Recent Myocardial Infarction] : no recent myocardial infarction [Implantable Device/Pacemaker] : no implantable device/pacemaker [Asthma] : no asthma [COPD] : no COPD [Sleep Apnea] : no sleep apnea [Smoker] : not a smoker [Family Member] : no family member with adverse anesthesia reaction/sudden death [Self] : no previous adverse anesthesia reaction [Chronic Anticoagulation] : no chronic anticoagulation [Chronic Kidney Disease] : no chronic kidney disease [Diabetes] : no diabetes [FreeTextEntry1] : Left hernia surgery [FreeTextEntry2] : 5/1/2021 [FreeTextEntry3] :  [FreeTextEntry4] : Pt reports feeling well.

## 2021-05-28 ENCOUNTER — APPOINTMENT (OUTPATIENT)
Dept: RHEUMATOLOGY | Facility: CLINIC | Age: 56
End: 2021-05-28
Payer: COMMERCIAL

## 2021-05-28 DIAGNOSIS — M25.532 PAIN IN LEFT WRIST: ICD-10-CM

## 2021-05-28 PROCEDURE — 99442: CPT

## 2021-05-29 ENCOUNTER — OUTPATIENT (OUTPATIENT)
Dept: OUTPATIENT SERVICES | Facility: HOSPITAL | Age: 56
LOS: 1 days | End: 2021-05-29
Payer: COMMERCIAL

## 2021-05-29 DIAGNOSIS — J34.2 DEVIATED NASAL SEPTUM: Chronic | ICD-10-CM

## 2021-05-29 DIAGNOSIS — Z90.49 ACQUIRED ABSENCE OF OTHER SPECIFIED PARTS OF DIGESTIVE TRACT: Chronic | ICD-10-CM

## 2021-05-29 DIAGNOSIS — Z11.52 ENCOUNTER FOR SCREENING FOR COVID-19: ICD-10-CM

## 2021-05-29 DIAGNOSIS — H33.21 SEROUS RETINAL DETACHMENT, RIGHT EYE: Chronic | ICD-10-CM

## 2021-05-29 DIAGNOSIS — Z98.890 OTHER SPECIFIED POSTPROCEDURAL STATES: Chronic | ICD-10-CM

## 2021-05-29 LAB — SARS-COV-2 RNA SPEC QL NAA+PROBE: SIGNIFICANT CHANGE UP

## 2021-05-29 PROCEDURE — U0005: CPT

## 2021-05-29 PROCEDURE — U0003: CPT

## 2021-05-29 PROCEDURE — C9803: CPT

## 2021-05-31 ENCOUNTER — TRANSCRIPTION ENCOUNTER (OUTPATIENT)
Age: 56
End: 2021-05-31

## 2021-06-01 ENCOUNTER — RESULT REVIEW (OUTPATIENT)
Age: 56
End: 2021-06-01

## 2021-06-01 ENCOUNTER — OUTPATIENT (OUTPATIENT)
Dept: OUTPATIENT SERVICES | Facility: HOSPITAL | Age: 56
LOS: 1 days | End: 2021-06-01
Payer: COMMERCIAL

## 2021-06-01 ENCOUNTER — APPOINTMENT (OUTPATIENT)
Dept: SURGERY | Facility: HOSPITAL | Age: 56
End: 2021-06-01
Payer: COMMERCIAL

## 2021-06-01 VITALS
TEMPERATURE: 99 F | HEIGHT: 75 IN | SYSTOLIC BLOOD PRESSURE: 106 MMHG | DIASTOLIC BLOOD PRESSURE: 65 MMHG | RESPIRATION RATE: 15 BRPM | HEART RATE: 69 BPM | WEIGHT: 149.91 LBS | OXYGEN SATURATION: 98 %

## 2021-06-01 VITALS
DIASTOLIC BLOOD PRESSURE: 62 MMHG | HEART RATE: 65 BPM | SYSTOLIC BLOOD PRESSURE: 111 MMHG | RESPIRATION RATE: 14 BRPM | OXYGEN SATURATION: 98 %

## 2021-06-01 DIAGNOSIS — Z01.818 ENCOUNTER FOR OTHER PREPROCEDURAL EXAMINATION: ICD-10-CM

## 2021-06-01 DIAGNOSIS — Z90.49 ACQUIRED ABSENCE OF OTHER SPECIFIED PARTS OF DIGESTIVE TRACT: Chronic | ICD-10-CM

## 2021-06-01 DIAGNOSIS — J34.2 DEVIATED NASAL SEPTUM: Chronic | ICD-10-CM

## 2021-06-01 DIAGNOSIS — K40.90 UNILATERAL INGUINAL HERNIA, WITHOUT OBSTRUCTION OR GANGRENE, NOT SPECIFIED AS RECURRENT: ICD-10-CM

## 2021-06-01 DIAGNOSIS — Z98.890 OTHER SPECIFIED POSTPROCEDURAL STATES: Chronic | ICD-10-CM

## 2021-06-01 DIAGNOSIS — H33.21 SEROUS RETINAL DETACHMENT, RIGHT EYE: Chronic | ICD-10-CM

## 2021-06-01 PROCEDURE — C9399: CPT

## 2021-06-01 PROCEDURE — 88300 SURGICAL PATH GROSS: CPT | Mod: 26

## 2021-06-01 PROCEDURE — 49520 REREPAIR ING HERNIA REDUCE: CPT | Mod: LT

## 2021-06-01 PROCEDURE — C1781: CPT

## 2021-06-01 PROCEDURE — 88300 SURGICAL PATH GROSS: CPT

## 2021-06-01 RX ORDER — ONDANSETRON 8 MG/1
4 TABLET, FILM COATED ORAL ONCE
Refills: 0 | Status: DISCONTINUED | OUTPATIENT
Start: 2021-06-01 | End: 2021-06-15

## 2021-06-01 RX ORDER — HYDROMORPHONE HYDROCHLORIDE 2 MG/ML
0.5 INJECTION INTRAMUSCULAR; INTRAVENOUS; SUBCUTANEOUS
Refills: 0 | Status: DISCONTINUED | OUTPATIENT
Start: 2021-06-01 | End: 2021-06-01

## 2021-06-01 RX ORDER — CHOLECALCIFEROL (VITAMIN D3) 125 MCG
1 CAPSULE ORAL
Qty: 0 | Refills: 0 | DISCHARGE

## 2021-06-01 RX ORDER — HYDROMORPHONE HYDROCHLORIDE 2 MG/ML
0.25 INJECTION INTRAMUSCULAR; INTRAVENOUS; SUBCUTANEOUS
Refills: 0 | Status: DISCONTINUED | OUTPATIENT
Start: 2021-06-01 | End: 2021-06-01

## 2021-06-01 NOTE — ASU PATIENT PROFILE, ADULT - PMH
Appendix disease    Deviated septum    Inguinal hernia    Psoriatic arthritis  under controlled without medication currently  Right retinal detachment

## 2021-06-01 NOTE — ASU DISCHARGE PLAN (ADULT/PEDIATRIC) - CARE PROVIDER_API CALL
Mayo Grace)  Surgery  [address] [facility], [address]  [place] [address]  Phone: [phone]  Fax: [phone]  Follow Up Time:

## 2021-06-01 NOTE — ASU DISCHARGE PLAN (ADULT/PEDIATRIC) - NURSING INSTRUCTIONS
**NEXT DOSE OF TYLENOL @0251PM AND EVERY 6 HOURS AFTERWARDS AS NEEDED FOR PAIN**  **NEXT DOSE OF IBUPROEN/ADVIL @0327PM AND EVERY 6 HOURS AFTERWARDS AS NEEDED FOR PAIN**

## 2021-06-01 NOTE — ASU PATIENT PROFILE, ADULT - PSH
Deviated septum  s/p repair  H/O right inguinal hernia repair    Right retinal detachment  s/p repair  S/P appendectomy

## 2021-06-04 PROBLEM — L40.50 ARTHROPATHIC PSORIASIS, UNSPECIFIED: Chronic | Status: ACTIVE | Noted: 2018-05-11

## 2021-06-14 LAB — SURGICAL PATHOLOGY STUDY: SIGNIFICANT CHANGE UP

## 2021-06-15 ENCOUNTER — APPOINTMENT (OUTPATIENT)
Dept: SURGERY | Facility: CLINIC | Age: 56
End: 2021-06-15
Payer: COMMERCIAL

## 2021-06-15 VITALS
HEART RATE: 71 BPM | OXYGEN SATURATION: 99 % | SYSTOLIC BLOOD PRESSURE: 113 MMHG | TEMPERATURE: 97.5 F | DIASTOLIC BLOOD PRESSURE: 73 MMHG | RESPIRATION RATE: 16 BRPM

## 2021-06-15 PROCEDURE — 99024 POSTOP FOLLOW-UP VISIT: CPT

## 2021-06-15 NOTE — HISTORY OF PRESENT ILLNESS
[de-identified] : Cliff is a 57 y/o male s/p Repair  of recurrent left inguinal hernia on 06/01/21.  Patient has no complaints.

## 2021-06-15 NOTE — PHYSICAL EXAM
[de-identified] : The left inguinal hernia repair is intact the left cord and testicle are normal.

## 2021-11-17 NOTE — H&P PST ADULT - VENOUS THROMBOEMBOLISM HISTORY
Mom contacted   Child and sibling tested positive for COVID (Home test) yesterday   Mom tested due to onset of symptoms     Patient with cough, described as dry   Nasal congestion   Sore throat   No fever   No wheezing  No shortness of breath   No abdomina
Pt & sibling tested positive for Covid. What should be done now?     Please advise
(0) indicator not present

## 2022-02-25 ENCOUNTER — NON-APPOINTMENT (OUTPATIENT)
Age: 57
End: 2022-02-25

## 2022-02-25 ENCOUNTER — APPOINTMENT (OUTPATIENT)
Dept: OPHTHALMOLOGY | Facility: CLINIC | Age: 57
End: 2022-02-25
Payer: COMMERCIAL

## 2022-02-25 ENCOUNTER — APPOINTMENT (OUTPATIENT)
Dept: INTERNAL MEDICINE | Facility: CLINIC | Age: 57
End: 2022-02-25
Payer: COMMERCIAL

## 2022-02-25 VITALS
OXYGEN SATURATION: 99 % | HEART RATE: 71 BPM | DIASTOLIC BLOOD PRESSURE: 70 MMHG | TEMPERATURE: 97.1 F | SYSTOLIC BLOOD PRESSURE: 110 MMHG | HEIGHT: 75 IN | WEIGHT: 150 LBS | BODY MASS INDEX: 18.65 KG/M2

## 2022-02-25 DIAGNOSIS — Z87.2 PERSONAL HISTORY OF DISEASES OF THE SKIN AND SUBCUTANEOUS TISSUE: ICD-10-CM

## 2022-02-25 DIAGNOSIS — Z12.5 ENCOUNTER FOR SCREENING FOR MALIGNANT NEOPLASM OF PROSTATE: ICD-10-CM

## 2022-02-25 DIAGNOSIS — Z87.19 PERSONAL HISTORY OF OTHER DISEASES OF THE DIGESTIVE SYSTEM: ICD-10-CM

## 2022-02-25 PROCEDURE — 92134 CPTRZ OPH DX IMG PST SGM RTA: CPT

## 2022-02-25 PROCEDURE — 92004 COMPRE OPH EXAM NEW PT 1/>: CPT

## 2022-02-25 PROCEDURE — 36415 COLL VENOUS BLD VENIPUNCTURE: CPT

## 2022-02-25 PROCEDURE — 99396 PREV VISIT EST AGE 40-64: CPT | Mod: 25

## 2022-02-25 PROCEDURE — 93000 ELECTROCARDIOGRAM COMPLETE: CPT | Mod: 59

## 2022-02-25 PROCEDURE — G0444 DEPRESSION SCREEN ANNUAL: CPT | Mod: NC,59

## 2022-02-25 RX ORDER — OXYCODONE 5 MG/1
5 TABLET ORAL EVERY 6 HOURS
Qty: 15 | Refills: 0 | Status: DISCONTINUED | COMMUNITY
Start: 2021-06-01 | End: 2022-02-25

## 2022-02-25 NOTE — ASSESSMENT
[FreeTextEntry1] : (1) HCM - Continue with diet, exercise, weight maintenance.  Labs ordered as below, including HIV testing. Hepatitis C screening negative November 2014.  Patient declines influenza, Tdap, and Shingrix vaccinations - risks/benefits were discussed.  He had COVID vaccine. Patient was given a GI referral for a screening colonoscopy.  Written information was given to patient on preparing advance directives in the past. Referrals provided as below. \par \par (2) Anemia- checking hgb, b12 level. advised GI referral \par \par 3) Post nasal drip. throat clearing. He is a professor and sometimes feels losing voice. 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

## 2022-02-25 NOTE — PHYSICAL EXAM
[No Acute Distress] : no acute distress [Well Nourished] : well nourished [Well Developed] : well developed [Well-Appearing] : well-appearing [Normal Voice/Communication] : normal voice/communication [Normal Sclera/Conjunctiva] : normal sclera/conjunctiva [PERRL] : pupils equal round and reactive to light [EOMI] : extraocular movements intact [Normal Outer Ear/Nose] : the outer ears and nose were normal in appearance [Normal Oropharynx] : the oropharynx was normal [No JVD] : no jugular venous distention [Supple] : supple [No Lymphadenopathy] : no lymphadenopathy [Thyroid Normal, No Nodules] : the thyroid was normal and there were no nodules present [No Respiratory Distress] : no respiratory distress  [Clear to Auscultation] : lungs were clear to auscultation bilaterally [No Accessory Muscle Use] : no accessory muscle use [Normal Rate] : normal rate  [Regular Rhythm] : with a regular rhythm [Normal S1, S2] : normal S1 and S2 [No Murmur] : no murmur heard [No Carotid Bruits] : no carotid bruits [No Abdominal Bruit] : a ~M bruit was not heard ~T in the abdomen [Pedal Pulses Present] : the pedal pulses are present [No Edema] : there was no peripheral edema [No Extremity Clubbing/Cyanosis] : no extremity clubbing/cyanosis [No Palpable Aorta] : no palpable aorta [Soft] : abdomen soft [Non Tender] : non-tender [Non-distended] : non-distended [No Masses] : no abdominal mass palpated [No HSM] : no HSM [Normal Bowel Sounds] : normal bowel sounds [Normal Sphincter Tone] : normal sphincter tone [No Mass] : no mass [Stool Occult Blood] : stool negative for occult blood [Urethral Meatus] : meatus normal [Urinary Bladder Findings] : the bladder was normal on palpation [Scrotum] : the scrotum was normal [Testes Mass (___cm)] : there were no testicular masses [Prostate Enlargement] : the prostate was not enlarged [Prostate Tenderness] : the prostate was not tender [No Prostate Nodules] : no prostate nodules [Normal Posterior Cervical Nodes] : no posterior cervical lymphadenopathy [Normal Anterior Cervical Nodes] : no anterior cervical lymphadenopathy [No CVA Tenderness] : no CVA  tenderness [No Spinal Tenderness] : no spinal tenderness [No Joint Swelling] : no joint swelling [Grossly Normal Strength/Tone] : grossly normal strength/tone [No Rash] : no rash [Normal Gait] : normal gait [Coordination Grossly Intact] : coordination grossly intact [No Focal Deficits] : no focal deficits [Deep Tendon Reflexes (DTR)] : deep tendon reflexes were 2+ and symmetric [Normal Affect] : the affect was normal [Normal Insight/Judgement] : insight and judgment were intact [de-identified] : +large varicose veins on right anterior lower leg [de-identified] : Small left inguinal hernia

## 2022-02-25 NOTE — HEALTH RISK ASSESSMENT
[Excellent] : ~his/her~  mood as  excellent [Former] : Former [No] : In the past 12 months have you used drugs other than those required for medical reasons? No [No falls in past year] : Patient reported no falls in the past year [0] : 2) Feeling down, depressed, or hopeless: Not at all (0) [PHQ-2 Negative - No further assessment needed] : PHQ-2 Negative - No further assessment needed [de-identified] : Quit early 30s, started about age 23, was 1/2ppd [de-identified] : walking [de-identified] : balanced [[id number]] : 0 [No Retinopathy] : No retinopathy [Patient reported colonoscopy was normal] : Patient reported colonoscopy was normal [HIV Test offered] : HIV Test offered [Change in mental status noted] : No change in mental status noted [Language] : denies difficulty with language [Behavior] : denies difficulty with behavior [Handling Complex Tasks] : denies difficulty handling complex tasks [Reasoning] : denies difficulty with reasoning [None] : None [Alone] : lives alone [Employed] : employed [] :  [Sexually Active] : sexually active [Feels Safe at Home] : Feels safe at home [Fully functional (bathing, dressing, toileting, transferring, walking, feeding)] : Fully functional (bathing, dressing, toileting, transferring, walking, feeding) [Fully functional (using the telephone, shopping, preparing meals, housekeeping, doing laundry, using] : Fully functional and needs no help or supervision to perform IADLs (using the telephone, shopping, preparing meals, housekeeping, doing laundry, using transportation, managing medications and managing finances) [Reports changes in hearing] : Reports no changes in hearing [Reports changes in vision] : Reports no changes in vision [Reports normal functional visual acuity (ie: able to read med bottle)] : Reports normal functional visual acuity [Reports changes in dental health] : Reports no changes in dental health [Smoke Detector] : smoke detector [Carbon Monoxide Detector] : carbon monoxide detector [Seat Belt] :  uses seat belt [Sunscreen] : uses sunscreen [ColonoscopyComments] : Will do Cologuard [HepatitisCDate] : 11/08/2014 [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 [de-identified] : Going regularly

## 2022-02-25 NOTE — HISTORY OF PRESENT ILLNESS
[FreeTextEntry1] : Physical [de-identified] : Mr. ELIEL ROWAN is a 57 year old man with pmhx of itchiness of contrast dye who presents for annual physical. He endorses being in overall good health and mood. He endorses generalized weakness. Reviewed past labs and imaging. Of note, he had decreasing hemoglobin. He has not had colonoscopy yet. Advised to start multivitamin. Amenable for complete blood work today. Declines all vaccines.

## 2022-02-28 LAB
25(OH)D3 SERPL-MCNC: 12.8 NG/ML
ALBUMIN SERPL ELPH-MCNC: 4.5 G/DL
ALP BLD-CCNC: 79 U/L
ALT SERPL-CCNC: 15 U/L
ANION GAP SERPL CALC-SCNC: 11 MMOL/L
APPEARANCE: CLEAR
AST SERPL-CCNC: 18 U/L
BACTERIA: NEGATIVE
BASOPHILS # BLD AUTO: 0.03 K/UL
BASOPHILS NFR BLD AUTO: 0.5 %
BILIRUB SERPL-MCNC: 0.4 MG/DL
BILIRUBIN URINE: NEGATIVE
BLOOD URINE: NEGATIVE
BUN SERPL-MCNC: 17 MG/DL
CALCIUM SERPL-MCNC: 9.6 MG/DL
CHLORIDE SERPL-SCNC: 102 MMOL/L
CHOLEST SERPL-MCNC: 230 MG/DL
CO2 SERPL-SCNC: 28 MMOL/L
COLOR: NORMAL
CREAT SERPL-MCNC: 0.73 MG/DL
EOSINOPHIL # BLD AUTO: 0.15 K/UL
EOSINOPHIL NFR BLD AUTO: 2.7 %
ESTIMATED AVERAGE GLUCOSE: 114 MG/DL
GLUCOSE QUALITATIVE U: NEGATIVE
GLUCOSE SERPL-MCNC: 82 MG/DL
HBA1C MFR BLD HPLC: 5.6 %
HCT VFR BLD CALC: 46.1 %
HDLC SERPL-MCNC: 79 MG/DL
HGB BLD-MCNC: 14.5 G/DL
HYALINE CASTS: 0 /LPF
IMM GRANULOCYTES NFR BLD AUTO: 0.2 %
KETONES URINE: NEGATIVE
LDLC SERPL CALC-MCNC: 135 MG/DL
LEUKOCYTE ESTERASE URINE: NEGATIVE
LYMPHOCYTES # BLD AUTO: 1.17 K/UL
LYMPHOCYTES NFR BLD AUTO: 21.4 %
MAN DIFF?: NORMAL
MCHC RBC-ENTMCNC: 29.4 PG
MCHC RBC-ENTMCNC: 31.5 GM/DL
MCV RBC AUTO: 93.3 FL
MICROSCOPIC-UA: NORMAL
MONOCYTES # BLD AUTO: 0.47 K/UL
MONOCYTES NFR BLD AUTO: 8.6 %
NEUTROPHILS # BLD AUTO: 3.64 K/UL
NEUTROPHILS NFR BLD AUTO: 66.6 %
NITRITE URINE: NEGATIVE
NONHDLC SERPL-MCNC: 151 MG/DL
PH URINE: 6.5
PLATELET # BLD AUTO: 220 K/UL
POTASSIUM SERPL-SCNC: 3.7 MMOL/L
PROT SERPL-MCNC: 7.2 G/DL
PROTEIN URINE: NEGATIVE
PSA SERPL-MCNC: 0.82 NG/ML
RBC # BLD: 4.94 M/UL
RBC # FLD: 15.2 %
RED BLOOD CELLS URINE: 1 /HPF
SODIUM SERPL-SCNC: 141 MMOL/L
SPECIFIC GRAVITY URINE: 1.01
SQUAMOUS EPITHELIAL CELLS: 0 /HPF
TRIGL SERPL-MCNC: 79 MG/DL
TSH SERPL-ACNC: 2.33 UIU/ML
UROBILINOGEN URINE: NORMAL
VIT B12 SERPL-MCNC: 381 PG/ML
WBC # FLD AUTO: 5.47 K/UL
WHITE BLOOD CELLS URINE: 0 /HPF

## 2022-02-28 RX ORDER — UBIDECARENONE/VIT E ACET 100MG-5
50 MCG CAPSULE ORAL
Qty: 1 | Refills: 2 | Status: ACTIVE | COMMUNITY
Start: 2022-02-28 | End: 1900-01-01

## 2022-03-01 LAB — URINE CYTOLOGY: NORMAL

## 2022-03-11 ENCOUNTER — APPOINTMENT (OUTPATIENT)
Dept: OTOLARYNGOLOGY | Facility: CLINIC | Age: 57
End: 2022-03-11
Payer: COMMERCIAL

## 2022-03-11 VITALS
TEMPERATURE: 97.2 F | HEART RATE: 86 BPM | HEIGHT: 75 IN | WEIGHT: 150 LBS | DIASTOLIC BLOOD PRESSURE: 76 MMHG | BODY MASS INDEX: 18.65 KG/M2 | SYSTOLIC BLOOD PRESSURE: 136 MMHG

## 2022-03-11 DIAGNOSIS — R68.89 OTHER GENERAL SYMPTOMS AND SIGNS: ICD-10-CM

## 2022-03-11 DIAGNOSIS — J34.2 DEVIATED NASAL SEPTUM: ICD-10-CM

## 2022-03-11 DIAGNOSIS — R07.0 PAIN IN THROAT: ICD-10-CM

## 2022-03-11 DIAGNOSIS — K21.9 GASTRO-ESOPHAGEAL REFLUX DISEASE W/OUT ESOPHAGITIS: ICD-10-CM

## 2022-03-11 PROCEDURE — 31575 DIAGNOSTIC LARYNGOSCOPY: CPT

## 2022-03-11 PROCEDURE — 99204 OFFICE O/P NEW MOD 45 MIN: CPT | Mod: 25

## 2022-03-11 RX ORDER — FLUTICASONE PROPIONATE 50 UG/1
50 SPRAY, METERED NASAL DAILY
Qty: 1 | Refills: 3 | Status: ACTIVE | COMMUNITY
Start: 2022-03-11 | End: 1900-01-01

## 2022-03-11 NOTE — ASSESSMENT
[FreeTextEntry1] : Patient was then 911 since then has been having some sensations and having to clear his throat he finally got around to having his throat examined and evaluated.  On examination he is a severely deviated septum in his left nasal cavity fiberoptic evaluation of his larynx notes it showed no evidence of any tumors masses or any abnormality of concern except for some mild edema of his arytenoids as reflux is a possible etiology of his symptoms I put him on Prilosec Flonase nasal spray and a Medrol Dosepak to see if this will help him symptomatically will follow up and see us in 2 months.

## 2022-03-11 NOTE — HISTORY OF PRESENT ILLNESS
[de-identified] : Patient has been having a sore throat intermittently for the last 10-15 years. He denies acid reflux and does not have any changes in voice. He does have a very dry throat as well. He does not think he is swallowing mucus. Over the years he has tried a throat lozenge and this provides temporary relief. he does, however, admit that he always feels the need to clear his throat \par He has nasal congestion and runny nose frequently as well but denies seasonal allergies.

## 2022-03-11 NOTE — END OF VISIT
[FreeTextEntry3] : I saw and examined this patient in person. I have discussed with Isabelle Perez, Physician Assistant, in detail the above note and agree with the above assessment and plan of care.\par

## 2022-03-11 NOTE — CONSULT LETTER
[Dear  ___] : Dear  [unfilled], [Consult Letter:] : I had the pleasure of evaluating your patient, [unfilled]. [Please see my note below.] : Please see my note below. [Consult Closing:] : Thank you very much for allowing me to participate in the care of this patient.  If you have any questions, please do not hesitate to contact me. [Sincerely,] : Sincerely, [FreeTextEntry3] : Fredrick Uriarte MD\par [facility]\par Otolaryngology and Facial Plastics\par Associated Professor, [employer]\par

## 2022-03-11 NOTE — REVIEW OF SYSTEMS
[Nasal Congestion] : nasal congestion [Throat Clearing] : throat clearing [Throat Pain] : throat pain [Throat Dryness] : throat dryness [Negative] : Heme/Lymph

## 2022-03-18 ENCOUNTER — APPOINTMENT (OUTPATIENT)
Dept: OPHTHALMOLOGY | Facility: CLINIC | Age: 57
End: 2022-03-18
Payer: COMMERCIAL

## 2022-03-18 ENCOUNTER — NON-APPOINTMENT (OUTPATIENT)
Age: 57
End: 2022-03-18

## 2022-03-18 PROCEDURE — 66821 AFTER CATARACT LASER SURGERY: CPT | Mod: RT

## 2022-04-01 ENCOUNTER — APPOINTMENT (OUTPATIENT)
Dept: OPHTHALMOLOGY | Facility: CLINIC | Age: 57
End: 2022-04-01
Payer: COMMERCIAL

## 2022-04-01 ENCOUNTER — NON-APPOINTMENT (OUTPATIENT)
Age: 57
End: 2022-04-01

## 2022-04-01 PROCEDURE — 99024 POSTOP FOLLOW-UP VISIT: CPT

## 2022-04-29 ENCOUNTER — APPOINTMENT (OUTPATIENT)
Dept: GASTROENTEROLOGY | Facility: CLINIC | Age: 57
End: 2022-04-29

## 2022-09-27 ENCOUNTER — APPOINTMENT (OUTPATIENT)
Dept: GASTROENTEROLOGY | Facility: CLINIC | Age: 57
End: 2022-09-27

## 2022-09-27 VITALS
WEIGHT: 150 LBS | RESPIRATION RATE: 18 BRPM | DIASTOLIC BLOOD PRESSURE: 75 MMHG | HEART RATE: 70 BPM | BODY MASS INDEX: 18.65 KG/M2 | HEIGHT: 75 IN | SYSTOLIC BLOOD PRESSURE: 115 MMHG | OXYGEN SATURATION: 98 % | TEMPERATURE: 98 F

## 2022-09-27 DIAGNOSIS — Z12.12 ENCOUNTER FOR SCREENING FOR MALIGNANT NEOPLASM OF COLON: ICD-10-CM

## 2022-09-27 DIAGNOSIS — Z12.11 ENCOUNTER FOR SCREENING FOR MALIGNANT NEOPLASM OF COLON: ICD-10-CM

## 2022-09-27 PROCEDURE — 99204 OFFICE O/P NEW MOD 45 MIN: CPT

## 2022-09-27 NOTE — HISTORY OF PRESENT ILLNESS
[FreeTextEntry1] : 57-year-old male\par Complains of acid reflux, soreness in the back of his throat, seen by ENT\par Denies any typical heartburn complaints, no difficulty swallowing\par Mainly presents today for screening colonoscopy\par No history of any prior examination\par Denies any bowel complaints\par No family history of colon cancer\par \par Denies history of coronary disease congestive heart failure or dysrhythmia\par \par Social history: Mathematics and computer professor at [employer]

## 2022-09-27 NOTE — ASSESSMENT
[FreeTextEntry1] : 57-year-old male, average risk for colon cancer and polyps\par ENT complaints, no response to omeprazole, unclear if reflux related, patient without any heartburn or dysphagia complaints\par No indication for upper endoscopy at this time\par \par Plan\par Indications risks benefits and alternatives to colonoscopy reviewed with the patient\par Patient agreeable to examination\par \par Patient referred by Dr. Magdi Nash

## 2022-10-23 LAB — SARS-COV-2 N GENE NPH QL NAA+PROBE: NOT DETECTED

## 2022-10-26 ENCOUNTER — APPOINTMENT (OUTPATIENT)
Dept: GASTROENTEROLOGY | Facility: AMBULATORY MEDICAL SERVICES | Age: 57
End: 2022-10-26

## 2022-10-26 PROCEDURE — 45385 COLONOSCOPY W/LESION REMOVAL: CPT | Mod: 33

## 2022-11-03 ENCOUNTER — NON-APPOINTMENT (OUTPATIENT)
Age: 57
End: 2022-11-03

## 2023-03-17 ENCOUNTER — APPOINTMENT (OUTPATIENT)
Dept: INTERNAL MEDICINE | Facility: CLINIC | Age: 58
End: 2023-03-17
Payer: COMMERCIAL

## 2023-03-17 ENCOUNTER — NON-APPOINTMENT (OUTPATIENT)
Age: 58
End: 2023-03-17

## 2023-03-17 VITALS
WEIGHT: 142 LBS | TEMPERATURE: 98 F | HEART RATE: 79 BPM | DIASTOLIC BLOOD PRESSURE: 74 MMHG | SYSTOLIC BLOOD PRESSURE: 120 MMHG | OXYGEN SATURATION: 96 % | BODY MASS INDEX: 17.66 KG/M2 | HEIGHT: 75 IN

## 2023-03-17 DIAGNOSIS — K21.9 GASTRO-ESOPHAGEAL REFLUX DISEASE W/OUT ESOPHAGITIS: ICD-10-CM

## 2023-03-17 DIAGNOSIS — Z00.00 ENCOUNTER FOR GENERAL ADULT MEDICAL EXAMINATION W/OUT ABNORMAL FINDINGS: ICD-10-CM

## 2023-03-17 DIAGNOSIS — D18.03 HEMANGIOMA OF INTRA-ABDOMINAL STRUCTURES: ICD-10-CM

## 2023-03-17 PROCEDURE — 99396 PREV VISIT EST AGE 40-64: CPT | Mod: 25

## 2023-03-17 PROCEDURE — 36415 COLL VENOUS BLD VENIPUNCTURE: CPT

## 2023-03-17 RX ORDER — SODIUM PICOSULFATE, MAGNESIUM OXIDE, AND ANHYDROUS CITRIC ACID 10; 3.5; 12 MG/160ML; G/160ML; G/160ML
10-3.5-12 MG-GM LIQUID ORAL
Qty: 1 | Refills: 0 | Status: DISCONTINUED | COMMUNITY
Start: 2022-09-27 | End: 2023-03-17

## 2023-03-17 RX ORDER — METHYLPREDNISOLONE 4 MG/1
4 TABLET ORAL
Qty: 1 | Refills: 3 | Status: DISCONTINUED | COMMUNITY
Start: 2022-03-11 | End: 2023-03-17

## 2023-03-17 RX ORDER — OMEPRAZOLE 20 MG/1
20 CAPSULE, DELAYED RELEASE ORAL DAILY
Qty: 30 | Refills: 0 | Status: DISCONTINUED | COMMUNITY
Start: 2022-03-11 | End: 2023-03-17

## 2023-03-17 NOTE — DATA REVIEWED
[FreeTextEntry1] : Reviewed recent notes, labs and imaging today. And updated patient's EMR. Discussed plan as below with patient. \par \par ECG- Sinus Rhythm with non specific QRS widening.  No major changes from the one before.

## 2023-03-17 NOTE — HISTORY OF PRESENT ILLNESS
[FreeTextEntry1] : Physical [de-identified] : Mr. ELIEL ROWAN is a 58 year old man with pmhx of itchiness of contrast dye who presents for annual physical. He endorses being in overall good health and mood. He has no complaints. Reviewed past labs and imaging. We discussed yogurt and milk intake. He had COVID vaccines.

## 2023-03-17 NOTE — ASSESSMENT
[FreeTextEntry1] : (1) HM - Continue with diet, exercise, weight maintenance.  Labs ordered as below, including HIV testing. Hepatitis C screening negative November 2014.  Patient declines influenza, Tdap, and Shingrix vaccinations - risks/benefits were discussed.  He had COVID vaccine. Colonoscopy- tubular adenoma - repeat in a few years.  Written information was given to patient on preparing advance directives in the past. Referrals provided as below. \par \par 2) Post nasal drip. throat clearing. 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 \par \par Patient is here for episodic care. All patient questions answered today and understood by patient. Henceforth, Patient to schedule follow up if new symptoms, questions, renewals or health concerns.

## 2023-03-17 NOTE — HEALTH RISK ASSESSMENT
[Excellent] : ~his/her~  mood as  excellent [No] : In the past 12 months have you used drugs other than those required for medical reasons? No [No falls in past year] : Patient reported no falls in the past year [0] : 2) Feeling down, depressed, or hopeless: Not at all (0) [PHQ-2 Negative - No further assessment needed] : PHQ-2 Negative - No further assessment needed [No Retinopathy] : No retinopathy [Patient reported colonoscopy was normal] : Patient reported colonoscopy was normal [None] : None [Alone] : lives alone [Employed] : employed [] :  [Sexually Active] : sexually active [Feels Safe at Home] : Feels safe at home [Fully functional (bathing, dressing, toileting, transferring, walking, feeding)] : Fully functional (bathing, dressing, toileting, transferring, walking, feeding) [Fully functional (using the telephone, shopping, preparing meals, housekeeping, doing laundry, using] : Fully functional and needs no help or supervision to perform IADLs (using the telephone, shopping, preparing meals, housekeeping, doing laundry, using transportation, managing medications and managing finances) [Reports normal functional visual acuity (ie: able to read med bottle)] : Reports normal functional visual acuity [Smoke Detector] : smoke detector [Carbon Monoxide Detector] : carbon monoxide detector [Seat Belt] :  uses seat belt [Sunscreen] : uses sunscreen [Patient/Caregiver not ready to engage] : , patient/caregiver not ready to engage [Former] : Former [0-4] : 0-4 [> 15 Years] : > 15 Years [de-identified] : walking [de-identified] : balanced [[id number]] : 0 [EyeExamDate] : 01/22 [Change in mental status noted] : No change in mental status noted [Language] : denies difficulty with language [Behavior] : denies difficulty with behavior [Handling Complex Tasks] : denies difficulty handling complex tasks [Reasoning] : denies difficulty with reasoning [Reports changes in hearing] : Reports no changes in hearing [Reports changes in vision] : Reports no changes in vision [Reports changes in dental health] : Reports no changes in dental health [ColonoscopyDate] : 10/22 [HepatitisCDate] : 11/14 [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 [de-identified] : Going regularly

## 2023-03-17 NOTE — PHYSICAL EXAM
[No Acute Distress] : no acute distress [Well Nourished] : well nourished [Well Developed] : well developed [Well-Appearing] : well-appearing [Normal Voice/Communication] : normal voice/communication [Normal Sclera/Conjunctiva] : normal sclera/conjunctiva [PERRL] : pupils equal round and reactive to light [EOMI] : extraocular movements intact [Normal Outer Ear/Nose] : the outer ears and nose were normal in appearance [Normal Oropharynx] : the oropharynx was normal [No JVD] : no jugular venous distention [Supple] : supple [No Lymphadenopathy] : no lymphadenopathy [Thyroid Normal, No Nodules] : the thyroid was normal and there were no nodules present [No Respiratory Distress] : no respiratory distress  [Clear to Auscultation] : lungs were clear to auscultation bilaterally [No Accessory Muscle Use] : no accessory muscle use [Normal Rate] : normal rate  [Regular Rhythm] : with a regular rhythm [Normal S1, S2] : normal S1 and S2 [No Murmur] : no murmur heard [No Carotid Bruits] : no carotid bruits [No Abdominal Bruit] : a ~M bruit was not heard ~T in the abdomen [Pedal Pulses Present] : the pedal pulses are present [No Edema] : there was no peripheral edema [No Extremity Clubbing/Cyanosis] : no extremity clubbing/cyanosis [No Palpable Aorta] : no palpable aorta [Soft] : abdomen soft [Non Tender] : non-tender [Non-distended] : non-distended [No Masses] : no abdominal mass palpated [No HSM] : no HSM [Normal Bowel Sounds] : normal bowel sounds [Normal Sphincter Tone] : normal sphincter tone [No Mass] : no mass [Urethral Meatus] : meatus normal [Urinary Bladder Findings] : the bladder was normal on palpation [Scrotum] : the scrotum was normal [Testes Mass (___cm)] : there were no testicular masses [Prostate Enlargement] : the prostate was not enlarged [Prostate Tenderness] : the prostate was not tender [No Prostate Nodules] : no prostate nodules [Normal Posterior Cervical Nodes] : no posterior cervical lymphadenopathy [Normal Anterior Cervical Nodes] : no anterior cervical lymphadenopathy [No CVA Tenderness] : no CVA  tenderness [No Spinal Tenderness] : no spinal tenderness [No Joint Swelling] : no joint swelling [Grossly Normal Strength/Tone] : grossly normal strength/tone [No Rash] : no rash [Normal Gait] : normal gait [Coordination Grossly Intact] : coordination grossly intact [No Focal Deficits] : no focal deficits [Deep Tendon Reflexes (DTR)] : deep tendon reflexes were 2+ and symmetric [Normal Affect] : the affect was normal [Normal Insight/Judgement] : insight and judgment were intact [Comprehensive Foot Exam Normal] : Right and left foot were examined and both feet are normal. No ulcers in either foot. Toes are normal and with full ROM.  Normal tactile sensation with monofilament testing throughout both feet [Stool Occult Blood] : stool negative for occult blood [de-identified] : Small left inguinal hernia

## 2023-03-20 LAB
25(OH)D3 SERPL-MCNC: 24.8 NG/ML
ALBUMIN SERPL ELPH-MCNC: 4.5 G/DL
ALP BLD-CCNC: 88 U/L
ALT SERPL-CCNC: 22 U/L
ANION GAP SERPL CALC-SCNC: 11 MMOL/L
APPEARANCE: CLEAR
AST SERPL-CCNC: 24 U/L
BACTERIA: NEGATIVE
BILIRUB SERPL-MCNC: 0.7 MG/DL
BILIRUBIN URINE: NEGATIVE
BLOOD URINE: NEGATIVE
BUN SERPL-MCNC: 15 MG/DL
CALCIUM SERPL-MCNC: 9.8 MG/DL
CALCIUM SERPL-MCNC: 9.8 MG/DL
CHLORIDE SERPL-SCNC: 100 MMOL/L
CHOLEST SERPL-MCNC: 241 MG/DL
CO2 SERPL-SCNC: 30 MMOL/L
COLOR: YELLOW
CREAT SERPL-MCNC: 0.92 MG/DL
EGFR: 95 ML/MIN/1.73M2
GLUCOSE QUALITATIVE U: NORMAL
GLUCOSE SERPL-MCNC: 101 MG/DL
HCT VFR BLD CALC: 47.4 %
HDLC SERPL-MCNC: 96 MG/DL
HGB BLD-MCNC: 15.1 G/DL
HYALINE CASTS: 5 /LPF
KETONES URINE: NEGATIVE
LDLC SERPL CALC-MCNC: 133 MG/DL
LEUKOCYTE ESTERASE URINE: NEGATIVE
MCHC RBC-ENTMCNC: 30.1 PG
MCHC RBC-ENTMCNC: 31.9 GM/DL
MCV RBC AUTO: 94.4 FL
MICROSCOPIC-UA: NORMAL
NITRITE URINE: NEGATIVE
NONHDLC SERPL-MCNC: 145 MG/DL
PARATHYROID HORMONE INTACT: 59 PG/ML
PH URINE: 6
PLATELET # BLD AUTO: 228 K/UL
POTASSIUM SERPL-SCNC: 3.7 MMOL/L
PROT SERPL-MCNC: 7.2 G/DL
PROTEIN URINE: ABNORMAL
RBC # BLD: 5.02 M/UL
RBC # FLD: 14.7 %
RED BLOOD CELLS URINE: 4 /HPF
SODIUM SERPL-SCNC: 141 MMOL/L
SPECIFIC GRAVITY URINE: 1.03
SQUAMOUS EPITHELIAL CELLS: 1 /HPF
TRIGL SERPL-MCNC: 59 MG/DL
UROBILINOGEN URINE: NORMAL
WBC # FLD AUTO: 5.44 K/UL
WHITE BLOOD CELLS URINE: 2 /HPF

## 2023-05-13 ENCOUNTER — EMERGENCY (EMERGENCY)
Facility: HOSPITAL | Age: 58
LOS: 1 days | Discharge: HOME | End: 2023-05-13
Payer: COMMERCIAL

## 2023-05-13 ENCOUNTER — TRANSCRIPTION ENCOUNTER (OUTPATIENT)
Age: 58
End: 2023-05-13

## 2023-05-13 VITALS
HEIGHT: 75 IN | HEART RATE: 60 BPM | OXYGEN SATURATION: 100 % | RESPIRATION RATE: 16 BRPM | DIASTOLIC BLOOD PRESSURE: 79 MMHG | SYSTOLIC BLOOD PRESSURE: 119 MMHG | WEIGHT: 149.91 LBS | TEMPERATURE: 98 F

## 2023-05-13 VITALS
TEMPERATURE: 98 F | SYSTOLIC BLOOD PRESSURE: 128 MMHG | DIASTOLIC BLOOD PRESSURE: 73 MMHG | OXYGEN SATURATION: 97 % | HEART RATE: 63 BPM | RESPIRATION RATE: 16 BRPM

## 2023-05-13 DIAGNOSIS — J34.2 DEVIATED NASAL SEPTUM: Chronic | ICD-10-CM

## 2023-05-13 DIAGNOSIS — H33.21 SEROUS RETINAL DETACHMENT, RIGHT EYE: Chronic | ICD-10-CM

## 2023-05-13 DIAGNOSIS — Z98.890 OTHER SPECIFIED POSTPROCEDURAL STATES: Chronic | ICD-10-CM

## 2023-05-13 DIAGNOSIS — Z90.49 ACQUIRED ABSENCE OF OTHER SPECIFIED PARTS OF DIGESTIVE TRACT: Chronic | ICD-10-CM

## 2023-05-13 LAB
ALBUMIN SERPL ELPH-MCNC: 4.3 G/DL — SIGNIFICANT CHANGE UP (ref 3.3–5)
ALP SERPL-CCNC: 78 U/L — SIGNIFICANT CHANGE UP (ref 40–120)
ALT FLD-CCNC: 16 U/L — SIGNIFICANT CHANGE UP (ref 10–45)
ANION GAP SERPL CALC-SCNC: 13 MMOL/L — SIGNIFICANT CHANGE UP (ref 5–17)
APTT BLD: 27.1 SEC — LOW (ref 27.5–35.5)
AST SERPL-CCNC: 23 U/L — SIGNIFICANT CHANGE UP (ref 10–40)
BASOPHILS # BLD AUTO: 0.02 K/UL — SIGNIFICANT CHANGE UP (ref 0–0.2)
BASOPHILS NFR BLD AUTO: 0.3 % — SIGNIFICANT CHANGE UP (ref 0–2)
BILIRUB SERPL-MCNC: 0.6 MG/DL — SIGNIFICANT CHANGE UP (ref 0.2–1.2)
BLD GP AB SCN SERPL QL: NEGATIVE — SIGNIFICANT CHANGE UP
BUN SERPL-MCNC: 12 MG/DL — SIGNIFICANT CHANGE UP (ref 7–23)
CALCIUM SERPL-MCNC: 9.3 MG/DL — SIGNIFICANT CHANGE UP (ref 8.4–10.5)
CHLORIDE SERPL-SCNC: 99 MMOL/L — SIGNIFICANT CHANGE UP (ref 96–108)
CO2 SERPL-SCNC: 25 MMOL/L — SIGNIFICANT CHANGE UP (ref 22–31)
CREAT SERPL-MCNC: 0.69 MG/DL — SIGNIFICANT CHANGE UP (ref 0.5–1.3)
EGFR: 106 ML/MIN/1.73M2 — SIGNIFICANT CHANGE UP
EOSINOPHIL # BLD AUTO: 0.01 K/UL — SIGNIFICANT CHANGE UP (ref 0–0.5)
EOSINOPHIL NFR BLD AUTO: 0.1 % — SIGNIFICANT CHANGE UP (ref 0–6)
GLUCOSE SERPL-MCNC: 115 MG/DL — HIGH (ref 70–99)
HCT VFR BLD CALC: 41.1 % — SIGNIFICANT CHANGE UP (ref 39–50)
HGB BLD-MCNC: 14 G/DL — SIGNIFICANT CHANGE UP (ref 13–17)
IMM GRANULOCYTES NFR BLD AUTO: 0.3 % — SIGNIFICANT CHANGE UP (ref 0–0.9)
INR BLD: 1.14 RATIO — SIGNIFICANT CHANGE UP (ref 0.88–1.16)
LIDOCAIN IGE QN: 17 U/L — SIGNIFICANT CHANGE UP (ref 7–60)
LYMPHOCYTES # BLD AUTO: 0.47 K/UL — LOW (ref 1–3.3)
LYMPHOCYTES # BLD AUTO: 6.3 % — LOW (ref 13–44)
MCHC RBC-ENTMCNC: 30.3 PG — SIGNIFICANT CHANGE UP (ref 27–34)
MCHC RBC-ENTMCNC: 34.1 GM/DL — SIGNIFICANT CHANGE UP (ref 32–36)
MCV RBC AUTO: 89 FL — SIGNIFICANT CHANGE UP (ref 80–100)
MONOCYTES # BLD AUTO: 0.35 K/UL — SIGNIFICANT CHANGE UP (ref 0–0.9)
MONOCYTES NFR BLD AUTO: 4.7 % — SIGNIFICANT CHANGE UP (ref 2–14)
NEUTROPHILS # BLD AUTO: 6.62 K/UL — SIGNIFICANT CHANGE UP (ref 1.8–7.4)
NEUTROPHILS NFR BLD AUTO: 88.3 % — HIGH (ref 43–77)
NRBC # BLD: 0 /100 WBCS — SIGNIFICANT CHANGE UP (ref 0–0)
PLATELET # BLD AUTO: 206 K/UL — SIGNIFICANT CHANGE UP (ref 150–400)
POTASSIUM SERPL-MCNC: 3.6 MMOL/L — SIGNIFICANT CHANGE UP (ref 3.5–5.3)
POTASSIUM SERPL-SCNC: 3.6 MMOL/L — SIGNIFICANT CHANGE UP (ref 3.5–5.3)
PROT SERPL-MCNC: 7.4 G/DL — SIGNIFICANT CHANGE UP (ref 6–8.3)
PROTHROM AB SERPL-ACNC: 13.2 SEC — SIGNIFICANT CHANGE UP (ref 10.5–13.4)
RBC # BLD: 4.62 M/UL — SIGNIFICANT CHANGE UP (ref 4.2–5.8)
RBC # FLD: 14 % — SIGNIFICANT CHANGE UP (ref 10.3–14.5)
RH IG SCN BLD-IMP: NEGATIVE — SIGNIFICANT CHANGE UP
SODIUM SERPL-SCNC: 137 MMOL/L — SIGNIFICANT CHANGE UP (ref 135–145)
TROPONIN T, HIGH SENSITIVITY RESULT: 7 NG/L — SIGNIFICANT CHANGE UP (ref 0–51)
WBC # BLD: 7.49 K/UL — SIGNIFICANT CHANGE UP (ref 3.8–10.5)
WBC # FLD AUTO: 7.49 K/UL — SIGNIFICANT CHANGE UP (ref 3.8–10.5)

## 2023-05-13 PROCEDURE — 84484 ASSAY OF TROPONIN QUANT: CPT

## 2023-05-13 PROCEDURE — 85730 THROMBOPLASTIN TIME PARTIAL: CPT

## 2023-05-13 PROCEDURE — 96374 THER/PROPH/DIAG INJ IV PUSH: CPT | Mod: XU

## 2023-05-13 PROCEDURE — 86900 BLOOD TYPING SEROLOGIC ABO: CPT

## 2023-05-13 PROCEDURE — 93005 ELECTROCARDIOGRAM TRACING: CPT

## 2023-05-13 PROCEDURE — 71045 X-RAY EXAM CHEST 1 VIEW: CPT | Mod: 26

## 2023-05-13 PROCEDURE — 99285 EMERGENCY DEPT VISIT HI MDM: CPT

## 2023-05-13 PROCEDURE — 71045 X-RAY EXAM CHEST 1 VIEW: CPT

## 2023-05-13 PROCEDURE — 85025 COMPLETE CBC W/AUTO DIFF WBC: CPT

## 2023-05-13 PROCEDURE — 76705 ECHO EXAM OF ABDOMEN: CPT

## 2023-05-13 PROCEDURE — 99285 EMERGENCY DEPT VISIT HI MDM: CPT | Mod: 25

## 2023-05-13 PROCEDURE — 76705 ECHO EXAM OF ABDOMEN: CPT | Mod: 26

## 2023-05-13 PROCEDURE — 86850 RBC ANTIBODY SCREEN: CPT

## 2023-05-13 PROCEDURE — 80053 COMPREHEN METABOLIC PANEL: CPT

## 2023-05-13 PROCEDURE — 83690 ASSAY OF LIPASE: CPT

## 2023-05-13 PROCEDURE — 96375 TX/PRO/DX INJ NEW DRUG ADDON: CPT

## 2023-05-13 PROCEDURE — 74177 CT ABD & PELVIS W/CONTRAST: CPT | Mod: 26,MA

## 2023-05-13 PROCEDURE — 86901 BLOOD TYPING SEROLOGIC RH(D): CPT

## 2023-05-13 PROCEDURE — 74177 CT ABD & PELVIS W/CONTRAST: CPT | Mod: MA

## 2023-05-13 PROCEDURE — 85610 PROTHROMBIN TIME: CPT

## 2023-05-13 RX ORDER — ONDANSETRON 8 MG/1
4 TABLET, FILM COATED ORAL ONCE
Refills: 0 | Status: COMPLETED | OUTPATIENT
Start: 2023-05-13 | End: 2023-05-13

## 2023-05-13 RX ORDER — MORPHINE SULFATE 50 MG/1
4 CAPSULE, EXTENDED RELEASE ORAL ONCE
Refills: 0 | Status: DISCONTINUED | OUTPATIENT
Start: 2023-05-13 | End: 2023-05-13

## 2023-05-13 RX ORDER — SODIUM CHLORIDE 9 MG/ML
1000 INJECTION INTRAMUSCULAR; INTRAVENOUS; SUBCUTANEOUS ONCE
Refills: 0 | Status: COMPLETED | OUTPATIENT
Start: 2023-05-13 | End: 2023-05-13

## 2023-05-13 RX ORDER — DIPHENHYDRAMINE HCL 50 MG
50 CAPSULE ORAL ONCE
Refills: 0 | Status: COMPLETED | OUTPATIENT
Start: 2023-05-13 | End: 2023-05-13

## 2023-05-13 RX ORDER — FAMOTIDINE 10 MG/ML
20 INJECTION INTRAVENOUS ONCE
Refills: 0 | Status: COMPLETED | OUTPATIENT
Start: 2023-05-13 | End: 2023-05-13

## 2023-05-13 RX ORDER — ACETAMINOPHEN 500 MG
975 TABLET ORAL ONCE
Refills: 0 | Status: COMPLETED | OUTPATIENT
Start: 2023-05-13 | End: 2023-05-13

## 2023-05-13 RX ADMIN — SODIUM CHLORIDE 1000 MILLILITER(S): 9 INJECTION INTRAMUSCULAR; INTRAVENOUS; SUBCUTANEOUS at 12:18

## 2023-05-13 RX ADMIN — Medication 50 MILLIGRAM(S): at 18:40

## 2023-05-13 RX ADMIN — FAMOTIDINE 20 MILLIGRAM(S): 10 INJECTION INTRAVENOUS at 21:47

## 2023-05-13 RX ADMIN — Medication 975 MILLIGRAM(S): at 14:39

## 2023-05-13 RX ADMIN — Medication 40 MILLIGRAM(S): at 17:50

## 2023-05-13 RX ADMIN — ONDANSETRON 4 MILLIGRAM(S): 8 TABLET, FILM COATED ORAL at 12:20

## 2023-05-13 NOTE — ED ADULT NURSE REASSESSMENT NOTE - NS ED NURSE REASSESS COMMENT FT1
Pt was premedicated with Solumedrol and Benadryl prior to receiving OV contrast.  He was able to complete Ct, but developed 1 hive on left temple area, but no other sx.  Med with Pepcid IV.  Pt repeatedly asking for food and something to drink, it has been explained to him multiple times that we are waiting on CT abd results prior to giving him any thing orally.

## 2023-05-13 NOTE — ED PROVIDER NOTE - PATIENT PORTAL LINK FT
You can access the FollowMyHealth Patient Portal offered by [facility] by registering at the following website: [email or web address]. By joining [facility]’s FollowMyHealth portal, you will also be able to view your health information using other applications (apps) compatible with our system.

## 2023-05-13 NOTE — ED ADULT NURSE NOTE - OBJECTIVE STATEMENT
Pt reports he was awakened from sleep earlier today with c/o pain in right upper back, n/v and generalized weakness.  Skin warm and dry, lips dry, not cracked.  Not c/o abd pain, but in obvious discomfort.  He declines MSo4, PA Narayan notified. Pt reports he was awakened from sleep earlier today with c/o pain in right upper quadrant abd pain radiating to right upper back, n/v and generalized weakness.  Skin warm and dry, lips dry, not cracked.  In obvious discomfort.  He declines MSo4, PA Narayan notified.

## 2023-05-13 NOTE — ED PROVIDER NOTE - CLINICAL SUMMARY MEDICAL DECISION MAKING FREE TEXT BOX
Medical decision making differential diagnosis at this time includes biliary colic, atypical ACS.  Although ECG does not suggest active ACS at this time.  Most appropriate initial test is right upper quadrant ultrasound basic labs lipase.  If ultrasound unremarkable would strongly consider CT abdomen to rule out other acute intra-abdominal pathology.

## 2023-05-13 NOTE — ED ADULT NURSE NOTE - CAS DISCH TRANSFER METHOD
From: Kalie Boswell  To: Chaparro Samuel MD  Sent: 4/14/2018 11:04 AM CDT  Subject: Donepezil    I have taken the Donepezil for a month. It seems to help. Raul says he can see a difference. I would like to try increasing the dose. The other can wait until I come in next time. Thanks.   Private car

## 2023-05-13 NOTE — ED PROVIDER NOTE - AXIS
Chief Complaint   Patient presents with   • Office Visit   • Surgical Followup     Post op right foot hardware removal DOS 8/5/22       SUBJECTIVE:  The patient returns to the clinic for followup of right foot Surgery. Patient underwent hardware removal on 08/05/2022. She relates she is doing much better.  No nausea, vomiting, fever, chills, or shortness of breath. Denies any other complaints.    PHYSICAL EXAMINATION:  Alignment satisfactory.  No complicating factors.  Incision is healing well without complication.  Neurovascular status is intact.  X-rays 3rd    ASSESSMENT:  1. Post-op pain          PLAN:  Discussed in depth and detail expected postoperative course.  Continue to ice and elevate.  Limit weight bearing.  Applied new dressing to the area   Refill postoperative pain medication  Orders Placed This Encounter   • oxyCODONE, IMM REL, (ROXICODONE) 5 MG immediate release tablet     Return in about 4 weeks (around 9/8/2022).       Normal

## 2023-05-13 NOTE — ED PROVIDER NOTE - ATTENDING APP SHARED VISIT CONTRIBUTION OF CARE
MD Watts:  patient seen and evaluated with the PA.  I was present for key portions of the History and Physical, and I agree with the Impression and Plan.  MD Watts:  59 yo M, c/o RUQ and R shoulder pain with associated nausea  Duration: woke him up from sleep this morning; ~6am.  Quality:  sharp  Associated Sx:  no vomiting, diarrhea, fever, or chills  Modifiers:  none  Context:   no MHx  Physical Exam:  adult M, uncomfortable-appearing, thin habitus, NCAT, PERRL, EOMI, neck supple, CTA B, no edema, AAOx3 ambulates w/o difficulty.    Abdomen: +RUQ TTP  Medical decision making differential diagnosis at this time includes biliary colic, atypical ACS.  Although ECG does not suggest active ACS at this time.  Most appropriate initial test is right upper quadrant ultrasound basic labs lipase.  If ultrasound unremarkable would strongly consider CT abdomen to rule out other acute intra-abdominal pathology.

## 2023-05-13 NOTE — ED PROVIDER NOTE - OBJECTIVE STATEMENT
58-year-old male PMHx retinal detachment, PSHx appendectomy presents with several hours of right upper abdominal pain radiating to right shoulder that woke him up from sleep.  Associated with several episodes of bilious emesis, unable to tolerate p.o.  Reports generalized weakness and chills.  Denies prior episodes of this pain.  Denies CP, SOB, palpitations, dizziness, diarrhea, urinary symptoms, rash.  Denies history of kidney stones.

## 2023-05-13 NOTE — ED PROVIDER NOTE - NSFOLLOWUPINSTRUCTIONS_ED_ALL_ED_FT
You were seen in the ER for shoulder pain. Your lab results were within normal limits. The ultrasound of your gallbladder was normal. The CT scan shows you may have distention of your gallbladder, your pain could be related to it. You can take tylenol and/or motrin for your pain. Please follow the instructions on the packaging.    Please follow up with your primary care doctor.    Please return to the ER if you have worsening symptoms including fever, chest pain, shortness of breath, abdominal pain, nausea, vomiting, diarrhea, weakness or lightheadedness/fainting.

## 2023-05-13 NOTE — ED ADULT NURSE NOTE - NSFALLUNIVINTERV_ED_ALL_ED
Bed/Stretcher in lowest position, wheels locked, appropriate side rails in place/Call bell, personal items and telephone in reach/Instruct patient to call for assistance before getting out of bed/chair/stretcher/Non-slip footwear applied when patient is off stretcher/[place] to call system/Physically safe environment - no spills, clutter or unnecessary equipment/Purposeful proactive rounding/Room/bathroom lighting operational, light cord in reach

## 2023-05-13 NOTE — ED PROVIDER NOTE - DIFFERENTIAL DIAGNOSIS
acute dusty, choledocholithiasis, ascending cholangitis, ACS, gastritis, pyelo, nephrolithiasis, septic nephrolithiasis Differential Diagnosis

## 2023-05-13 NOTE — ED ADULT TRIAGE NOTE - CHIEF COMPLAINT QUOTE
generalized weakness with nausea and vomiting since this morning  right upper back pain since this morning

## 2023-05-13 NOTE — ED PROVIDER NOTE - PROGRESS NOTE DETAILS
Attending MD Otto.  Pt signed out ot me in stable condition pending CT abd/pel, 57 yo with R sided abd'l and R shoulder pain, non-actionable vital signs, sono with gallstone without 2ndary changes. Attending MD Otto.  Shortly following signout pt transported to CT and endorses CT contrast allergy with hx of hives previously.  Pt brought back to Ed for pre-medication.  He is in no acute distress, sxs improved.  Planned pre-tx, CT and dispo per findings.  Pt updated re: plan.  Radiology called to alert to pre-tx. Had thorough risk/benefit discussion with pt regarding premedication for CT, all risks explained and pt is agreeable at this time. I confirmed the plan with radiology attending, pt was given appropriate dosing of steroids/benadryl - Narayan Allan PA-C Benny Muniz- pt had small red spots to L face during CT, felt itchy for 2 minutes, then stopped. gave pepcid, redness improved. pt has no other hives or difficulty breathing. discussed results with pt, strict precautions given. instructed to f/u with pcp, pt agrees.

## 2023-05-13 NOTE — ED PROVIDER NOTE - NS ED ATTENDING STATEMENT MOD
This was a shared visit with the [facility]. I reviewed and verified the documentation and independently performed the documented:

## 2023-05-15 ENCOUNTER — NON-APPOINTMENT (OUTPATIENT)
Age: 58
End: 2023-05-15

## 2023-08-04 ENCOUNTER — APPOINTMENT (OUTPATIENT)
Dept: GASTROENTEROLOGY | Facility: CLINIC | Age: 58
End: 2023-08-04

## 2023-08-24 NOTE — ED ADULT NURSE NOTE - NS ED NOTE ABUSE RESPONSE YN
SURGERY POST OPERATIVE NOTE    OPERATION:  Diagnostic laparoscopy converted to exploratory laparotomy and subtotal gastrectomy with distal rita-en-y       POST OP DAY: 1 Day Post-Op         SUBJECTIVE:  Gini was laying in bed this AM upset about how she was treated overnight by nursing staff. She states she had a rough night with her pain being a 10/10 at some points through out the night. She has noticed some leaking around her drain site. Patient denies passing gas or stool. No nausea or vomiting.    OBJECTIVE:   Vital Last Value 24 Hour Range   Temperature 98.6 °F (37 °C) (08/24/23 0500) Temp  Min: 97.3 °F (36.3 °C)  Max: 98.6 °F (37 °C)   Pulse 68 (08/24/23 0500) Pulse  Min: 68  Max: 96   Respiratory 16 (08/24/23 0500) Resp  Min: 12  Max: 20   Non-Invasive  Blood Pressure 127/68 (08/24/23 0500) BP  Min: 127/68  Max: 160/91   Pulse Oximetry 98 % (08/24/23 0500) SpO2  Min: 89 %  Max: 100 %   Arterial   Blood Pressure (!) 143/129 (08/23/23 1645) Arterial Line BP  Min: 106/95  Max: 143/129     RECENT WEIGHTS:  Weight    08/23/23 0959 08/23/23 1928   Weight: 47.6 kg (105 lb) 49.3 kg (108 lb 11 oz)       General: Alert, awake, in no acute distress.  Abdomen: Soft, nondistended, incisional tenderness, surgical incisions are clean, dry, and intact. Right sided drain with serosanguinous with slight leaking around drain.    Neurologic: Grossly normal.    Intake/Output:    Intake/Output Summary (Last 24 hours) at 8/24/2023 0710  Last data filed at 8/24/2023 0645  Gross per 24 hour   Intake 3610 ml   Output 3045 ml   Net 565 ml        Last Stool Occurrence:       Laboratory Results:  Recent Labs   Lab 08/24/23  0442   WBC 10.5   RBC 3.29*   HCT 31.9*   HGB 10.1*        Recent Labs   Lab 08/24/23  0442   SODIUM 140   POTASSIUM 4.1   CHLORIDE 107   CO2 27   GLUCOSE 121*   BUN 7   CREATININE 0.48*   CALCIUM 8.4   ALBUMIN 2.8*   MG 1.6*   BILIRUBIN 0.5   ALKPT 57   AST 89*   GPT 60       Imaging:  No results  found.      ASSESSMENT:  Gini Navarro is a 57 y.o. female with a past medical history significant for gastric cancer previously treated with FLOT chemotherapy, chronic pancreatitis and alcohol use disorder. Patient is post-op day 1 from an exploratory laparotomy and subtotal gastrectomy with distal rita-en-y. Patient doing okay post op.     PLAN:  -Continue PCA Dilaudid   -IV Tylenol TID  -4% Lidocaine patch  -IV Protonix   -Complete post-op antibiotics IV flagyl  -Scheduled albuterol   -Lovenox injection  -PRN zofran   -Endorse moving around   -Consider removing catheter  -PT/OT  -Continue NPO   -FL upper GI tomorrow to check anastomosis consider DC NG tube tomorrow       Christal Bland, MS3     Attending Note:    Surgical details discussed  Abd soft  NG small blood  Drain serosang  UGI in am    I have examined the patient, reviewed the pertinent diagnostic studies and medical data, and have participated in the development of the treatment plan with the Fellow / Resident / Advanced Practice Practitioner / Medical Student. I have made appropriate addendums and agree with the documentation stated above.  Greater than 50% of visit and decision making was made by me personally.      Nabeel Villalta MD FACS  Surgical Oncology        Yes

## 2023-12-20 NOTE — PHYSICAL EXAM
"Hello,    I did a test claim on Accu-Chek Guide supplies and the patient's insurance([facility] [place] [employer] [place])prefers [employer](Contour Next) products. I also did a test claim on Contour Next Monitor, Test Strips and Microlet Lancets and they're all covered $0 co-pay. If Accu-Chek Guide must be used please provide me with the charted notes that explains why patient must use Accu-Chek Guide Testing Supplies. If Contour can be used, I suggest sending new Rx's that states \"No Brand Specific\" so that pharmacy can change if formulary ever changes.    Thank You!    Sav Okeefe [facility] Pharm[facility] Liaison  [facility]  [email or web address]  Phone: [phone]  Fax: [phone]    " [No Acute Distress] : no acute distress [Well Nourished] : well nourished [Well Developed] : well developed [Well-Appearing] : well-appearing [Normal Sclera/Conjunctiva] : normal sclera/conjunctiva [Normal Voice/Communication] : normal voice/communication [PERRL] : pupils equal round and reactive to light [EOMI] : extraocular movements intact [Normal Outer Ear/Nose] : the outer ears and nose were normal in appearance [Normal Oropharynx] : the oropharynx was normal [No JVD] : no jugular venous distention [Supple] : supple [No Lymphadenopathy] : no lymphadenopathy [Thyroid Normal, No Nodules] : the thyroid was normal and there were no nodules present [No Respiratory Distress] : no respiratory distress  [Clear to Auscultation] : lungs were clear to auscultation bilaterally [No Accessory Muscle Use] : no accessory muscle use [Normal Rate] : normal rate  [Regular Rhythm] : with a regular rhythm [Normal S1, S2] : normal S1 and S2 [No Carotid Bruits] : no carotid bruits [No Murmur] : no murmur heard [No Abdominal Bruit] : a ~M bruit was not heard ~T in the abdomen [No Edema] : there was no peripheral edema [Pedal Pulses Present] : the pedal pulses are present [No Palpable Aorta] : no palpable aorta [No Extremity Clubbing/Cyanosis] : no extremity clubbing/cyanosis [Non Tender] : non-tender [Soft] : abdomen soft [Non-distended] : non-distended [No Masses] : no abdominal mass palpated [No HSM] : no HSM [Normal Bowel Sounds] : normal bowel sounds [Normal Sphincter Tone] : normal sphincter tone [No Mass] : no mass [Urethral Meatus] : meatus normal [Scrotum] : the scrotum was normal [Urinary Bladder Findings] : the bladder was normal on palpation [Testes Mass (___cm)] : there were no testicular masses [Prostate Tenderness] : the prostate was not tender [Prostate Enlargement] : the prostate was not enlarged [Normal Posterior Cervical Nodes] : no posterior cervical lymphadenopathy [No Prostate Nodules] : no prostate nodules [No CVA Tenderness] : no CVA  tenderness [Normal Anterior Cervical Nodes] : no anterior cervical lymphadenopathy [No Spinal Tenderness] : no spinal tenderness [Grossly Normal Strength/Tone] : grossly normal strength/tone [No Joint Swelling] : no joint swelling [No Rash] : no rash [Coordination Grossly Intact] : coordination grossly intact [Normal Gait] : normal gait [No Focal Deficits] : no focal deficits [Deep Tendon Reflexes (DTR)] : deep tendon reflexes were 2+ and symmetric [Normal Affect] : the affect was normal [Normal Insight/Judgement] : insight and judgment were intact [Stool Occult Blood] : stool negative for occult blood [de-identified] : +large varicose veins on right anterior lower leg [de-identified] : Small left inguinal hernia

## 2024-03-22 ENCOUNTER — NON-APPOINTMENT (OUTPATIENT)
Age: 59
End: 2024-03-22

## 2024-03-22 ENCOUNTER — APPOINTMENT (OUTPATIENT)
Dept: INTERNAL MEDICINE | Facility: CLINIC | Age: 59
End: 2024-03-22
Payer: COMMERCIAL

## 2024-03-22 VITALS
SYSTOLIC BLOOD PRESSURE: 110 MMHG | HEART RATE: 76 BPM | HEIGHT: 75 IN | TEMPERATURE: 97.9 F | WEIGHT: 133 LBS | OXYGEN SATURATION: 99 % | BODY MASS INDEX: 16.54 KG/M2 | DIASTOLIC BLOOD PRESSURE: 80 MMHG

## 2024-03-22 DIAGNOSIS — Z00.00 ENCOUNTER FOR GENERAL ADULT MEDICAL EXAMINATION W/OUT ABNORMAL FINDINGS: ICD-10-CM

## 2024-03-22 PROCEDURE — 93000 ELECTROCARDIOGRAM COMPLETE: CPT | Mod: 59

## 2024-03-22 PROCEDURE — 36415 COLL VENOUS BLD VENIPUNCTURE: CPT

## 2024-03-22 PROCEDURE — 99396 PREV VISIT EST AGE 40-64: CPT

## 2024-03-22 NOTE — HISTORY OF PRESENT ILLNESS
[FreeTextEntry1] : CPE [de-identified] : Mr. ROWAN is a 59 year old male that presents to the office for a CPE. Pt feels very well, no complaints. Takes Vit D intermittently. Does report mucous/throat clearing - worse in winter - no current medication use.  PMHx: No significant hx Medications: Fluticasone PRN Pt is due for labs today EKG today  Last eye exam - 6 months ago - Nov Sees dental regularly, upcoming visit Derm - about 1 year ago, has had screening - WNL  110/80 133lb  -Flu - Declined -COVID19 - Primary series x2, 1 booster -TDAP/MMR - Titers pending -Shingles vaccine - Pt to consider -Colonoscopy - Oct 2022  Advised starting nasal sinus rinse w/ fluticasone daily Trial of Zyrtec Wrote down names of medications (OTC) and sinus rinse and handed to patient Labs today Pt considering Shingles vaccine

## 2024-03-22 NOTE — PHYSICAL EXAM
[No Acute Distress] : no acute distress [Well Nourished] : well nourished [Well-Appearing] : well-appearing [Well Developed] : well developed [PERRL] : pupils equal round and reactive to light [Normal Sclera/Conjunctiva] : normal sclera/conjunctiva [EOMI] : extraocular movements intact [Normal Outer Ear/Nose] : the outer ears and nose were normal in appearance [Normal Oropharynx] : the oropharynx was normal [No Lymphadenopathy] : no lymphadenopathy [No JVD] : no jugular venous distention [Supple] : supple [Thyroid Normal, No Nodules] : the thyroid was normal and there were no nodules present [No Respiratory Distress] : no respiratory distress  [No Accessory Muscle Use] : no accessory muscle use [Clear to Auscultation] : lungs were clear to auscultation bilaterally [Normal Rate] : normal rate  [Regular Rhythm] : with a regular rhythm [Normal S1, S2] : normal S1 and S2 [No Carotid Bruits] : no carotid bruits [No Murmur] : no murmur heard [No Abdominal Bruit] : a ~M bruit was not heard ~T in the abdomen [No Varicosities] : no varicosities [Pedal Pulses Present] : the pedal pulses are present [No Palpable Aorta] : no palpable aorta [No Edema] : there was no peripheral edema [Soft] : abdomen soft [No Extremity Clubbing/Cyanosis] : no extremity clubbing/cyanosis [Non-distended] : non-distended [Non Tender] : non-tender [No HSM] : no HSM [No Masses] : no abdominal mass palpated [Normal Bowel Sounds] : normal bowel sounds [Normal Posterior Cervical Nodes] : no posterior cervical lymphadenopathy [Normal Anterior Cervical Nodes] : no anterior cervical lymphadenopathy [No Spinal Tenderness] : no spinal tenderness [No CVA Tenderness] : no CVA  tenderness [No Joint Swelling] : no joint swelling [Grossly Normal Strength/Tone] : grossly normal strength/tone [No Rash] : no rash [No Focal Deficits] : no focal deficits [Coordination Grossly Intact] : coordination grossly intact [Normal Gait] : normal gait [Deep Tendon Reflexes (DTR)] : deep tendon reflexes were 2+ and symmetric [Normal Affect] : the affect was normal [Normal Insight/Judgement] : insight and judgment were intact

## 2024-03-22 NOTE — HEALTH RISK ASSESSMENT
[Excellent] : ~his/her~  mood as  excellent [No] : In the past 12 months have you used drugs other than those required for medical reasons? No [No falls in past year] : Patient reported no falls in the past year [0] : 2) Feeling down, depressed, or hopeless: Not at all (0) [PHQ-2 Negative - No further assessment needed] : PHQ-2 Negative - No further assessment needed [No Retinopathy] : No retinopathy [Patient reported colonoscopy was normal] : Patient reported colonoscopy was normal [None] : None [Alone] : lives alone [Employed] : employed [] :  [Sexually Active] : sexually active [Feels Safe at Home] : Feels safe at home [Fully functional (bathing, dressing, toileting, transferring, walking, feeding)] : Fully functional (bathing, dressing, toileting, transferring, walking, feeding) [Fully functional (using the telephone, shopping, preparing meals, housekeeping, doing laundry, using] : Fully functional and needs no help or supervision to perform IADLs (using the telephone, shopping, preparing meals, housekeeping, doing laundry, using transportation, managing medications and managing finances) [Reports normal functional visual acuity (ie: able to read med bottle)] : Reports normal functional visual acuity [Smoke Detector] : smoke detector [Carbon Monoxide Detector] : carbon monoxide detector [Sunscreen] : uses sunscreen [Seat Belt] :  uses seat belt [Patient/Caregiver not ready to engage] : , patient/caregiver not ready to engage [Former] : Former [> 15 Years] : > 15 Years [0-4] : 0-4 [de-identified] : walking [de-identified] : balanced [[id number]] : 0 [EyeExamDate] : 01/22 [Change in mental status noted] : No change in mental status noted [Language] : denies difficulty with language [Behavior] : denies difficulty with behavior [Handling Complex Tasks] : denies difficulty handling complex tasks [Reasoning] : denies difficulty with reasoning [Reports changes in hearing] : Reports no changes in hearing [Reports changes in vision] : Reports no changes in vision [Reports changes in dental health] : Reports no changes in dental health [ColonoscopyDate] : 10/22 [HepatitisCDate] : 11/14 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 Robb (ophthalmologist). [de-identified] : Going regularly

## 2024-03-28 ENCOUNTER — APPOINTMENT (OUTPATIENT)
Dept: INTERNAL MEDICINE | Facility: CLINIC | Age: 59
End: 2024-03-28
Payer: COMMERCIAL

## 2024-03-28 DIAGNOSIS — E55.9 VITAMIN D DEFICIENCY, UNSPECIFIED: ICD-10-CM

## 2024-03-28 DIAGNOSIS — R09.82 POSTNASAL DRIP: ICD-10-CM

## 2024-03-28 DIAGNOSIS — Z23 ENCOUNTER FOR IMMUNIZATION: ICD-10-CM

## 2024-03-28 DIAGNOSIS — E78.5 HYPERLIPIDEMIA, UNSPECIFIED: ICD-10-CM

## 2024-03-28 PROCEDURE — 99442: CPT

## 2024-04-05 ENCOUNTER — APPOINTMENT (OUTPATIENT)
Dept: INTERNAL MEDICINE | Facility: CLINIC | Age: 59
End: 2024-04-05
Payer: COMMERCIAL

## 2024-04-05 PROCEDURE — 90471 IMMUNIZATION ADMIN: CPT

## 2024-04-05 PROCEDURE — 90715 TDAP VACCINE 7 YRS/> IM: CPT

## 2024-05-05 LAB
25(OH)D3 SERPL-MCNC: 14.2 NG/ML
ALBUMIN SERPL ELPH-MCNC: 4.3 G/DL
ALP BLD-CCNC: 94 U/L
ALT SERPL-CCNC: 11 U/L
ANION GAP SERPL CALC-SCNC: 11 MMOL/L
APPEARANCE: CLEAR
AST SERPL-CCNC: 19 U/L
BACTERIA: NEGATIVE /HPF
BILIRUB SERPL-MCNC: 0.5 MG/DL
BILIRUBIN URINE: NEGATIVE
BLOOD URINE: NEGATIVE
BUN SERPL-MCNC: 16 MG/DL
C TETANI IGG SER-ACNC: <0.1 IU/ML
CALCIUM SERPL-MCNC: 9.3 MG/DL
CAST: 1 /LPF
CHLORIDE SERPL-SCNC: 100 MMOL/L
CHOLEST SERPL-MCNC: 208 MG/DL
CO2 SERPL-SCNC: 30 MMOL/L
COLOR: NORMAL
CREAT SERPL-MCNC: 0.82 MG/DL
EGFR: 100 ML/MIN/1.73M2
EPITHELIAL CELLS: 1 /HPF
ESTIMATED AVERAGE GLUCOSE: 111 MG/DL
GLUCOSE QUALITATIVE U: NEGATIVE MG/DL
GLUCOSE SERPL-MCNC: 85 MG/DL
HBA1C MFR BLD HPLC: 5.5 %
HCT VFR BLD CALC: 43.4 %
HDLC SERPL-MCNC: 81 MG/DL
HGB BLD-MCNC: 13.9 G/DL
KETONES URINE: ABNORMAL MG/DL
LDLC SERPL CALC-MCNC: 117 MG/DL
LEUKOCYTE ESTERASE URINE: ABNORMAL
MCHC RBC-ENTMCNC: 30.2 PG
MCHC RBC-ENTMCNC: 32 GM/DL
MCV RBC AUTO: 94.1 FL
MICROSCOPIC-UA: NORMAL
NITRITE URINE: NEGATIVE
NONHDLC SERPL-MCNC: 128 MG/DL
PH URINE: 7
PLATELET # BLD AUTO: 206 K/UL
POTASSIUM SERPL-SCNC: 4.4 MMOL/L
PROT SERPL-MCNC: 6.7 G/DL
PROTEIN URINE: NORMAL MG/DL
PSA FREE FLD-MCNC: 41 %
PSA FREE SERPL-MCNC: 0.49 NG/ML
PSA SERPL-MCNC: 1.2 NG/ML
RBC # BLD: 4.61 M/UL
RBC # FLD: 14.7 %
RED BLOOD CELLS URINE: 2 /HPF
SODIUM SERPL-SCNC: 140 MMOL/L
SPECIFIC GRAVITY URINE: 1.02
TRIGL SERPL-MCNC: 60 MG/DL
TSH SERPL-ACNC: 1.38 UIU/ML
UROBILINOGEN URINE: 1 MG/DL
WBC # FLD AUTO: 4.22 K/UL
WHITE BLOOD CELLS URINE: 5 /HPF

## 2024-12-11 ENCOUNTER — APPOINTMENT (OUTPATIENT)
Dept: INTERNAL MEDICINE | Facility: CLINIC | Age: 59
End: 2024-12-11

## 2025-03-31 ENCOUNTER — APPOINTMENT (OUTPATIENT)
Dept: INTERNAL MEDICINE | Facility: CLINIC | Age: 60
End: 2025-03-31

## 2025-03-31 ENCOUNTER — NON-APPOINTMENT (OUTPATIENT)
Age: 60
End: 2025-03-31

## 2025-03-31 VITALS
BODY MASS INDEX: 16.16 KG/M2 | OXYGEN SATURATION: 98 % | TEMPERATURE: 97.6 F | DIASTOLIC BLOOD PRESSURE: 68 MMHG | HEIGHT: 75 IN | WEIGHT: 130 LBS | SYSTOLIC BLOOD PRESSURE: 110 MMHG | HEART RATE: 67 BPM

## 2025-03-31 DIAGNOSIS — E78.5 HYPERLIPIDEMIA, UNSPECIFIED: ICD-10-CM

## 2025-03-31 DIAGNOSIS — R09.82 POSTNASAL DRIP: ICD-10-CM

## 2025-03-31 DIAGNOSIS — Z00.00 ENCOUNTER FOR GENERAL ADULT MEDICAL EXAMINATION W/OUT ABNORMAL FINDINGS: ICD-10-CM

## 2025-03-31 PROCEDURE — G0444 DEPRESSION SCREEN ANNUAL: CPT | Mod: 59

## 2025-03-31 PROCEDURE — 99396 PREV VISIT EST AGE 40-64: CPT

## 2025-03-31 PROCEDURE — 36415 COLL VENOUS BLD VENIPUNCTURE: CPT

## 2025-04-01 LAB
ALBUMIN SERPL ELPH-MCNC: 4.1 G/DL
ALP BLD-CCNC: 114 U/L
ALT SERPL-CCNC: 14 U/L
ANION GAP SERPL CALC-SCNC: 13 MMOL/L
AST SERPL-CCNC: 20 U/L
BILIRUB SERPL-MCNC: 0.5 MG/DL
BUN SERPL-MCNC: 27 MG/DL
CALCIUM SERPL-MCNC: 9.7 MG/DL
CHLORIDE SERPL-SCNC: 102 MMOL/L
CHOLEST SERPL-MCNC: 191 MG/DL
CO2 SERPL-SCNC: 26 MMOL/L
CREAT SERPL-MCNC: 0.77 MG/DL
EGFRCR SERPLBLD CKD-EPI 2021: 101 ML/MIN/1.73M2
ESTIMATED AVERAGE GLUCOSE: 123 MG/DL
GLUCOSE SERPL-MCNC: 86 MG/DL
HBA1C MFR BLD HPLC: 5.9 %
HCT VFR BLD CALC: 43.5 %
HDLC SERPL-MCNC: 82 MG/DL
HGB BLD-MCNC: 13.5 G/DL
LDLC SERPL-MCNC: 99 MG/DL
MCHC RBC-ENTMCNC: 30.2 PG
MCHC RBC-ENTMCNC: 31 G/DL
MCV RBC AUTO: 97.3 FL
NONHDLC SERPL-MCNC: 109 MG/DL
PLATELET # BLD AUTO: 214 K/UL
POTASSIUM SERPL-SCNC: 4.2 MMOL/L
PROT SERPL-MCNC: 7 G/DL
PSA FREE FLD-MCNC: 42 %
PSA FREE SERPL-MCNC: 0.37 NG/ML
PSA SERPL-MCNC: 0.89 NG/ML
RBC # BLD: 4.47 M/UL
RBC # FLD: 14.9 %
SODIUM SERPL-SCNC: 142 MMOL/L
TRIGL SERPL-MCNC: 53 MG/DL
TSH SERPL-ACNC: 2 UIU/ML
WBC # FLD AUTO: 4.56 K/UL

## 2025-06-10 NOTE — H&P PST ADULT - NS PRO ABUSE SCREEN AFRAID ANYONE YN
[Weight Loss (___ Lbs)] : [unfilled] ~Ulb weight loss [Joint Swelling] : joint swelling [Negative] : Musculoskeletal no

## 2025-07-21 ENCOUNTER — NON-APPOINTMENT (OUTPATIENT)
Age: 60
End: 2025-07-21

## 2025-07-21 ENCOUNTER — APPOINTMENT (OUTPATIENT)
Dept: RHEUMATOLOGY | Facility: CLINIC | Age: 60
End: 2025-07-21
Payer: COMMERCIAL

## 2025-07-21 VITALS
DIASTOLIC BLOOD PRESSURE: 80 MMHG | HEART RATE: 80 BPM | BODY MASS INDEX: 17.41 KG/M2 | RESPIRATION RATE: 16 BRPM | OXYGEN SATURATION: 98 % | SYSTOLIC BLOOD PRESSURE: 133 MMHG | WEIGHT: 140 LBS | HEIGHT: 75 IN

## 2025-07-21 DIAGNOSIS — M48.9 SPONDYLOPATHY, UNSPECIFIED: ICD-10-CM

## 2025-07-21 DIAGNOSIS — M54.50 LOW BACK PAIN, UNSPECIFIED: ICD-10-CM

## 2025-07-21 PROCEDURE — 99204 OFFICE O/P NEW MOD 45 MIN: CPT

## 2025-07-21 PROCEDURE — G2211 COMPLEX E/M VISIT ADD ON: CPT | Mod: NC

## 2025-07-29 ENCOUNTER — APPOINTMENT (OUTPATIENT)
Dept: RADIOLOGY | Facility: CLINIC | Age: 60
End: 2025-07-29

## 2025-07-31 DIAGNOSIS — Z00.00 ENCOUNTER FOR GENERAL ADULT MEDICAL EXAMINATION W/OUT ABNORMAL FINDINGS: ICD-10-CM

## 2025-08-14 ENCOUNTER — APPOINTMENT (OUTPATIENT)
Dept: ENDOCRINOLOGY | Facility: CLINIC | Age: 60
End: 2025-08-14
Payer: COMMERCIAL

## 2025-08-14 VITALS — WEIGHT: 132 LBS | HEIGHT: 71.5 IN | BODY MASS INDEX: 18.07 KG/M2

## 2025-08-14 PROCEDURE — 77080 DXA BONE DENSITY AXIAL: CPT

## 2025-09-17 ENCOUNTER — APPOINTMENT (OUTPATIENT)
Dept: OTOLARYNGOLOGY | Facility: CLINIC | Age: 60
End: 2025-09-17
Payer: COMMERCIAL

## 2025-09-17 VITALS
HEART RATE: 68 BPM | TEMPERATURE: 97.5 F | BODY MASS INDEX: 18.2 KG/M2 | HEIGHT: 71 IN | WEIGHT: 130 LBS | SYSTOLIC BLOOD PRESSURE: 116 MMHG | DIASTOLIC BLOOD PRESSURE: 76 MMHG | OXYGEN SATURATION: 98 %

## 2025-09-17 DIAGNOSIS — H90.3 SENSORINEURAL HEARING LOSS, BILATERAL: ICD-10-CM

## 2025-09-17 DIAGNOSIS — K21.9 GASTRO-ESOPHAGEAL REFLUX DISEASE W/OUT ESOPHAGITIS: ICD-10-CM

## 2025-09-17 DIAGNOSIS — R09.82 POSTNASAL DRIP: ICD-10-CM

## 2025-09-17 DIAGNOSIS — R42 DIZZINESS AND GIDDINESS: ICD-10-CM

## 2025-09-17 PROCEDURE — 92557 COMPREHENSIVE HEARING TEST: CPT

## 2025-09-17 PROCEDURE — 31231 NASAL ENDOSCOPY DX: CPT

## 2025-09-17 PROCEDURE — 92567 TYMPANOMETRY: CPT

## 2025-09-17 PROCEDURE — 99204 OFFICE O/P NEW MOD 45 MIN: CPT | Mod: 25
